# Patient Record
Sex: FEMALE | Race: WHITE | NOT HISPANIC OR LATINO
[De-identification: names, ages, dates, MRNs, and addresses within clinical notes are randomized per-mention and may not be internally consistent; named-entity substitution may affect disease eponyms.]

---

## 2018-03-20 PROBLEM — Z00.00 ENCOUNTER FOR PREVENTIVE HEALTH EXAMINATION: Status: ACTIVE | Noted: 2018-03-20

## 2018-03-28 ENCOUNTER — NON-APPOINTMENT (OUTPATIENT)
Age: 83
End: 2018-03-28

## 2018-03-28 ENCOUNTER — APPOINTMENT (OUTPATIENT)
Dept: INTERNAL MEDICINE | Facility: CLINIC | Age: 83
End: 2018-03-28
Payer: MEDICARE

## 2018-03-28 VITALS
HEIGHT: 62 IN | HEART RATE: 69 BPM | DIASTOLIC BLOOD PRESSURE: 80 MMHG | BODY MASS INDEX: 24.48 KG/M2 | OXYGEN SATURATION: 100 % | TEMPERATURE: 97.9 F | SYSTOLIC BLOOD PRESSURE: 220 MMHG | WEIGHT: 133 LBS

## 2018-03-28 VITALS — DIASTOLIC BLOOD PRESSURE: 60 MMHG | SYSTOLIC BLOOD PRESSURE: 188 MMHG

## 2018-03-28 DIAGNOSIS — Z13.89 ENCOUNTER FOR SCREENING FOR OTHER DISORDER: ICD-10-CM

## 2018-03-28 DIAGNOSIS — Z86.79 PERSONAL HISTORY OF OTHER DISEASES OF THE CIRCULATORY SYSTEM: ICD-10-CM

## 2018-03-28 DIAGNOSIS — Z78.9 OTHER SPECIFIED HEALTH STATUS: ICD-10-CM

## 2018-03-28 DIAGNOSIS — Z80.0 FAMILY HISTORY OF MALIGNANT NEOPLASM OF DIGESTIVE ORGANS: ICD-10-CM

## 2018-03-28 DIAGNOSIS — L57.0 ACTINIC KERATOSIS: ICD-10-CM

## 2018-03-28 PROCEDURE — 99204 OFFICE O/P NEW MOD 45 MIN: CPT | Mod: 25

## 2018-03-28 PROCEDURE — 36415 COLL VENOUS BLD VENIPUNCTURE: CPT

## 2018-03-28 PROCEDURE — 93000 ELECTROCARDIOGRAM COMPLETE: CPT

## 2018-03-28 PROCEDURE — G0444 DEPRESSION SCREEN ANNUAL: CPT | Mod: 59

## 2018-03-29 ENCOUNTER — APPOINTMENT (OUTPATIENT)
Dept: INTERNAL MEDICINE | Facility: CLINIC | Age: 83
End: 2018-03-29
Payer: MEDICARE

## 2018-03-29 VITALS — WEIGHT: 129 LBS | BODY MASS INDEX: 23.59 KG/M2

## 2018-03-29 VITALS — SYSTOLIC BLOOD PRESSURE: 186 MMHG | DIASTOLIC BLOOD PRESSURE: 68 MMHG

## 2018-03-29 VITALS — DIASTOLIC BLOOD PRESSURE: 60 MMHG | OXYGEN SATURATION: 98 % | HEART RATE: 72 BPM | SYSTOLIC BLOOD PRESSURE: 188 MMHG

## 2018-03-29 DIAGNOSIS — I27.20 PULMONARY HYPERTENSION, UNSPECIFIED: ICD-10-CM

## 2018-03-29 DIAGNOSIS — I16.0 HYPERTENSIVE URGENCY: ICD-10-CM

## 2018-03-29 PROCEDURE — 99215 OFFICE O/P EST HI 40 MIN: CPT

## 2018-04-05 LAB
ALDOSTERONE SERUM: 8 NG/DL
APTT BLD: 34.1 SEC
BASOPHILS # BLD AUTO: 0.01 K/UL
BASOPHILS NFR BLD AUTO: 0.1 %
EOSINOPHIL # BLD AUTO: 0.04 K/UL
EOSINOPHIL NFR BLD AUTO: 0.6 %
HCT VFR BLD CALC: 40.5 %
HGB BLD-MCNC: 13.1 G/DL
IMM GRANULOCYTES NFR BLD AUTO: 0.3 %
INR PPP: 0.96 RATIO
LYMPHOCYTES # BLD AUTO: 2 K/UL
LYMPHOCYTES NFR BLD AUTO: 28.5 %
MAN DIFF?: NORMAL
MCHC RBC-ENTMCNC: 32.3 GM/DL
MCHC RBC-ENTMCNC: 32.3 PG
MCV RBC AUTO: 100 FL
METANEPHRINE, PL: <10 PG/ML
MONOCYTES # BLD AUTO: 0.95 K/UL
MONOCYTES NFR BLD AUTO: 13.6 %
NEUTROPHILS # BLD AUTO: 3.99 K/UL
NEUTROPHILS NFR BLD AUTO: 56.9 %
NORMETANEPHRINE, PL: <10 PG/ML
PLATELET # BLD AUTO: 127 K/UL
PT BLD: 10.8 SEC
RBC # BLD: 4.05 M/UL
RBC # FLD: 13.6 %
TSH SERPL-ACNC: 1.66 UIU/ML
WBC # FLD AUTO: 7.01 K/UL

## 2018-04-06 LAB — RENIN ACTIVITY, PLASMA: 0.25 NG/ML/HR

## 2018-04-18 ENCOUNTER — APPOINTMENT (OUTPATIENT)
Dept: INTERNAL MEDICINE | Facility: CLINIC | Age: 83
End: 2018-04-18

## 2018-04-24 ENCOUNTER — APPOINTMENT (OUTPATIENT)
Dept: INTERNAL MEDICINE | Facility: CLINIC | Age: 83
End: 2018-04-24

## 2018-05-14 ENCOUNTER — APPOINTMENT (OUTPATIENT)
Dept: INTERNAL MEDICINE | Facility: CLINIC | Age: 83
End: 2018-05-14

## 2018-05-23 ENCOUNTER — RX RENEWAL (OUTPATIENT)
Age: 83
End: 2018-05-23

## 2018-06-12 ENCOUNTER — APPOINTMENT (OUTPATIENT)
Dept: INTERNAL MEDICINE | Facility: CLINIC | Age: 83
End: 2018-06-12
Payer: MEDICARE

## 2018-06-12 VITALS
HEART RATE: 69 BPM | OXYGEN SATURATION: 99 % | BODY MASS INDEX: 23.92 KG/M2 | SYSTOLIC BLOOD PRESSURE: 156 MMHG | DIASTOLIC BLOOD PRESSURE: 65 MMHG | TEMPERATURE: 98 F | HEIGHT: 62 IN | WEIGHT: 130 LBS

## 2018-06-12 VITALS — SYSTOLIC BLOOD PRESSURE: 172 MMHG | DIASTOLIC BLOOD PRESSURE: 60 MMHG

## 2018-06-12 PROCEDURE — 99214 OFFICE O/P EST MOD 30 MIN: CPT | Mod: 25

## 2018-06-12 PROCEDURE — 36415 COLL VENOUS BLD VENIPUNCTURE: CPT

## 2018-06-14 LAB
ALBUMIN SERPL ELPH-MCNC: 4.3 G/DL
ALP BLD-CCNC: 108 U/L
ALT SERPL-CCNC: 25 U/L
ANION GAP SERPL CALC-SCNC: 17 MMOL/L
AST SERPL-CCNC: 23 U/L
BILIRUB SERPL-MCNC: 0.4 MG/DL
BUN SERPL-MCNC: 16 MG/DL
CALCIUM SERPL-MCNC: 10 MG/DL
CHLORIDE SERPL-SCNC: 102 MMOL/L
CO2 SERPL-SCNC: 25 MMOL/L
CREAT SERPL-MCNC: 1 MG/DL
GLUCOSE SERPL-MCNC: 83 MG/DL
POTASSIUM SERPL-SCNC: 4.4 MMOL/L
PROT SERPL-MCNC: 7.4 G/DL
SODIUM SERPL-SCNC: 144 MMOL/L

## 2018-07-02 ENCOUNTER — APPOINTMENT (OUTPATIENT)
Dept: INTERNAL MEDICINE | Facility: CLINIC | Age: 83
End: 2018-07-02
Payer: MEDICARE

## 2018-07-02 VITALS
DIASTOLIC BLOOD PRESSURE: 62 MMHG | BODY MASS INDEX: 23.92 KG/M2 | SYSTOLIC BLOOD PRESSURE: 155 MMHG | RESPIRATION RATE: 16 BRPM | HEIGHT: 62 IN | WEIGHT: 130 LBS | HEART RATE: 83 BPM | OXYGEN SATURATION: 97 % | TEMPERATURE: 98.4 F

## 2018-07-02 DIAGNOSIS — Z01.818 ENCOUNTER FOR OTHER PREPROCEDURAL EXAMINATION: ICD-10-CM

## 2018-07-02 PROCEDURE — 99214 OFFICE O/P EST MOD 30 MIN: CPT

## 2018-07-18 ENCOUNTER — APPOINTMENT (OUTPATIENT)
Dept: INTERNAL MEDICINE | Facility: CLINIC | Age: 83
End: 2018-07-18

## 2018-07-25 ENCOUNTER — RX RENEWAL (OUTPATIENT)
Age: 83
End: 2018-07-25

## 2018-08-18 ENCOUNTER — RX RENEWAL (OUTPATIENT)
Age: 83
End: 2018-08-18

## 2018-08-21 ENCOUNTER — RX RENEWAL (OUTPATIENT)
Age: 83
End: 2018-08-21

## 2018-11-30 ENCOUNTER — APPOINTMENT (OUTPATIENT)
Dept: INTERNAL MEDICINE | Facility: CLINIC | Age: 83
End: 2018-11-30
Payer: MEDICARE

## 2018-11-30 VITALS
BODY MASS INDEX: 25.91 KG/M2 | OXYGEN SATURATION: 98 % | RESPIRATION RATE: 16 BRPM | WEIGHT: 132 LBS | DIASTOLIC BLOOD PRESSURE: 65 MMHG | TEMPERATURE: 98.2 F | HEART RATE: 65 BPM | SYSTOLIC BLOOD PRESSURE: 159 MMHG | HEIGHT: 60 IN

## 2018-11-30 DIAGNOSIS — I35.0 NONRHEUMATIC AORTIC (VALVE) STENOSIS: ICD-10-CM

## 2018-11-30 DIAGNOSIS — H26.9 UNSPECIFIED CATARACT: ICD-10-CM

## 2018-11-30 PROCEDURE — 36415 COLL VENOUS BLD VENIPUNCTURE: CPT

## 2018-11-30 PROCEDURE — 99214 OFFICE O/P EST MOD 30 MIN: CPT | Mod: 25

## 2018-11-30 PROCEDURE — 93000 ELECTROCARDIOGRAM COMPLETE: CPT

## 2018-12-02 VITALS — DIASTOLIC BLOOD PRESSURE: 58 MMHG | SYSTOLIC BLOOD PRESSURE: 164 MMHG

## 2018-12-02 PROBLEM — H26.9 CATARACT: Status: ACTIVE | Noted: 2018-03-29

## 2018-12-02 PROBLEM — I35.0 AORTIC STENOSIS: Status: ACTIVE | Noted: 2018-06-12

## 2019-03-16 ENCOUNTER — RX RENEWAL (OUTPATIENT)
Age: 84
End: 2019-03-16

## 2022-03-04 ENCOUNTER — APPOINTMENT (OUTPATIENT)
Dept: INTERNAL MEDICINE | Facility: CLINIC | Age: 87
End: 2022-03-04

## 2023-06-05 ENCOUNTER — INPATIENT (INPATIENT)
Facility: HOSPITAL | Age: 88
LOS: 5 days | Discharge: HOME HEALTH | DRG: 149 | End: 2023-06-11
Attending: PSYCHIATRY & NEUROLOGY | Admitting: PSYCHIATRY & NEUROLOGY
Payer: MEDICARE

## 2023-06-05 VITALS
RESPIRATION RATE: 18 BRPM | WEIGHT: 132.06 LBS | OXYGEN SATURATION: 95 % | SYSTOLIC BLOOD PRESSURE: 207 MMHG | HEART RATE: 125 BPM | TEMPERATURE: 98 F | HEIGHT: 62 IN | DIASTOLIC BLOOD PRESSURE: 86 MMHG

## 2023-06-05 LAB
ALBUMIN SERPL ELPH-MCNC: 4.7 G/DL — SIGNIFICANT CHANGE UP (ref 3.3–5)
ALP SERPL-CCNC: 178 U/L — HIGH (ref 40–120)
ALT FLD-CCNC: 17 U/L — SIGNIFICANT CHANGE UP (ref 10–45)
ANION GAP SERPL CALC-SCNC: 15 MMOL/L — SIGNIFICANT CHANGE UP (ref 5–17)
APPEARANCE UR: CLEAR — SIGNIFICANT CHANGE UP
APTT BLD: SIGNIFICANT CHANGE UP SEC (ref 27.5–35.5)
AST SERPL-CCNC: 28 U/L — SIGNIFICANT CHANGE UP (ref 10–40)
BACTERIA # UR AUTO: SIGNIFICANT CHANGE UP /HPF
BASOPHILS # BLD AUTO: 0.02 K/UL — SIGNIFICANT CHANGE UP (ref 0–0.2)
BASOPHILS NFR BLD AUTO: 0.2 % — SIGNIFICANT CHANGE UP (ref 0–2)
BILIRUB SERPL-MCNC: 0.9 MG/DL — SIGNIFICANT CHANGE UP (ref 0.2–1.2)
BILIRUB UR-MCNC: NEGATIVE — SIGNIFICANT CHANGE UP
BUN SERPL-MCNC: 8 MG/DL — SIGNIFICANT CHANGE UP (ref 7–23)
CALCIUM SERPL-MCNC: 9.5 MG/DL — SIGNIFICANT CHANGE UP (ref 8.4–10.5)
CHLORIDE SERPL-SCNC: 100 MMOL/L — SIGNIFICANT CHANGE UP (ref 96–108)
CO2 SERPL-SCNC: 23 MMOL/L — SIGNIFICANT CHANGE UP (ref 22–31)
COLOR SPEC: YELLOW — SIGNIFICANT CHANGE UP
CREAT SERPL-MCNC: 0.65 MG/DL — SIGNIFICANT CHANGE UP (ref 0.5–1.3)
DIFF PNL FLD: ABNORMAL
EGFR: 86 ML/MIN/1.73M2 — SIGNIFICANT CHANGE UP
EOSINOPHIL # BLD AUTO: 0.01 K/UL — SIGNIFICANT CHANGE UP (ref 0–0.5)
EOSINOPHIL NFR BLD AUTO: 0.1 % — SIGNIFICANT CHANGE UP (ref 0–6)
EPI CELLS # UR: SIGNIFICANT CHANGE UP /HPF (ref 0–5)
GLUCOSE SERPL-MCNC: 158 MG/DL — HIGH (ref 70–99)
GLUCOSE UR QL: 100
HCT VFR BLD CALC: 52.9 % — HIGH (ref 34.5–45)
HGB BLD-MCNC: 17.6 G/DL — HIGH (ref 11.5–15.5)
IMM GRANULOCYTES NFR BLD AUTO: 0.5 % — SIGNIFICANT CHANGE UP (ref 0–0.9)
INR BLD: 1.07 — SIGNIFICANT CHANGE UP (ref 0.88–1.16)
KETONES UR-MCNC: 15 MG/DL
LEUKOCYTE ESTERASE UR-ACNC: NEGATIVE — SIGNIFICANT CHANGE UP
LYMPHOCYTES # BLD AUTO: 0.63 K/UL — LOW (ref 1–3.3)
LYMPHOCYTES # BLD AUTO: 7.4 % — LOW (ref 13–44)
MCHC RBC-ENTMCNC: 31.1 PG — SIGNIFICANT CHANGE UP (ref 27–34)
MCHC RBC-ENTMCNC: 33.3 GM/DL — SIGNIFICANT CHANGE UP (ref 32–36)
MCV RBC AUTO: 93.5 FL — SIGNIFICANT CHANGE UP (ref 80–100)
MONOCYTES # BLD AUTO: 0.52 K/UL — SIGNIFICANT CHANGE UP (ref 0–0.9)
MONOCYTES NFR BLD AUTO: 6.1 % — SIGNIFICANT CHANGE UP (ref 2–14)
NEUTROPHILS # BLD AUTO: 7.33 K/UL — SIGNIFICANT CHANGE UP (ref 1.8–7.4)
NEUTROPHILS NFR BLD AUTO: 85.7 % — HIGH (ref 43–77)
NITRITE UR-MCNC: NEGATIVE — SIGNIFICANT CHANGE UP
NRBC # BLD: 0 /100 WBCS — SIGNIFICANT CHANGE UP (ref 0–0)
PH UR: 7.5 — SIGNIFICANT CHANGE UP (ref 5–8)
PLATELET # BLD AUTO: 75 K/UL — LOW (ref 150–400)
POTASSIUM SERPL-MCNC: 4.1 MMOL/L — SIGNIFICANT CHANGE UP (ref 3.5–5.3)
POTASSIUM SERPL-SCNC: 4.1 MMOL/L — SIGNIFICANT CHANGE UP (ref 3.5–5.3)
PROT SERPL-MCNC: 8 G/DL — SIGNIFICANT CHANGE UP (ref 6–8.3)
PROT UR-MCNC: >=300 MG/DL
PROTHROM AB SERPL-ACNC: 12.7 SEC — SIGNIFICANT CHANGE UP (ref 10.5–13.4)
RBC # BLD: 5.66 M/UL — HIGH (ref 3.8–5.2)
RBC # FLD: 13.1 % — SIGNIFICANT CHANGE UP (ref 10.3–14.5)
RBC CASTS # UR COMP ASSIST: ABNORMAL /HPF
SODIUM SERPL-SCNC: 138 MMOL/L — SIGNIFICANT CHANGE UP (ref 135–145)
SP GR SPEC: 1.02 — SIGNIFICANT CHANGE UP (ref 1–1.03)
UROBILINOGEN FLD QL: 0.2 E.U./DL — SIGNIFICANT CHANGE UP
WBC # BLD: 8.55 K/UL — SIGNIFICANT CHANGE UP (ref 3.8–10.5)
WBC # FLD AUTO: 8.55 K/UL — SIGNIFICANT CHANGE UP (ref 3.8–10.5)
WBC UR QL: < 5 /HPF — SIGNIFICANT CHANGE UP

## 2023-06-05 PROCEDURE — 70498 CT ANGIOGRAPHY NECK: CPT | Mod: 26,MC

## 2023-06-05 PROCEDURE — 71045 X-RAY EXAM CHEST 1 VIEW: CPT | Mod: 26

## 2023-06-05 PROCEDURE — 99291 CRITICAL CARE FIRST HOUR: CPT

## 2023-06-05 PROCEDURE — 70496 CT ANGIOGRAPHY HEAD: CPT | Mod: 26,MC

## 2023-06-05 PROCEDURE — 0042T: CPT

## 2023-06-05 RX ORDER — ONDANSETRON 8 MG/1
4 TABLET, FILM COATED ORAL EVERY 6 HOURS
Refills: 0 | Status: DISCONTINUED | OUTPATIENT
Start: 2023-06-05 | End: 2023-06-08

## 2023-06-05 RX ORDER — APIXABAN 2.5 MG/1
2.5 TABLET, FILM COATED ORAL EVERY 12 HOURS
Refills: 0 | Status: DISCONTINUED | OUTPATIENT
Start: 2023-06-05 | End: 2023-06-11

## 2023-06-05 RX ORDER — SODIUM CHLORIDE 9 MG/ML
1000 INJECTION INTRAMUSCULAR; INTRAVENOUS; SUBCUTANEOUS
Refills: 0 | Status: DISCONTINUED | OUTPATIENT
Start: 2023-06-05 | End: 2023-06-06

## 2023-06-05 RX ORDER — LABETALOL HCL 100 MG
5 TABLET ORAL ONCE
Refills: 0 | Status: COMPLETED | OUTPATIENT
Start: 2023-06-05 | End: 2023-06-05

## 2023-06-05 RX ORDER — ATORVASTATIN CALCIUM 80 MG/1
80 TABLET, FILM COATED ORAL AT BEDTIME
Refills: 0 | Status: DISCONTINUED | OUTPATIENT
Start: 2023-06-05 | End: 2023-06-07

## 2023-06-05 RX ORDER — MECLIZINE HCL 12.5 MG
25 TABLET ORAL ONCE
Refills: 0 | Status: COMPLETED | OUTPATIENT
Start: 2023-06-05 | End: 2023-06-05

## 2023-06-05 RX ORDER — MECLIZINE HCL 12.5 MG
12.5 TABLET ORAL EVERY 8 HOURS
Refills: 0 | Status: DISCONTINUED | OUTPATIENT
Start: 2023-06-05 | End: 2023-06-06

## 2023-06-05 RX ORDER — SODIUM CHLORIDE 9 MG/ML
1000 INJECTION INTRAMUSCULAR; INTRAVENOUS; SUBCUTANEOUS ONCE
Refills: 0 | Status: COMPLETED | OUTPATIENT
Start: 2023-06-05 | End: 2023-06-05

## 2023-06-05 RX ORDER — METOPROLOL TARTRATE 50 MG
5 TABLET ORAL ONCE
Refills: 0 | Status: COMPLETED | OUTPATIENT
Start: 2023-06-05 | End: 2023-06-05

## 2023-06-05 RX ORDER — ONDANSETRON 8 MG/1
4 TABLET, FILM COATED ORAL ONCE
Refills: 0 | Status: COMPLETED | OUTPATIENT
Start: 2023-06-05 | End: 2023-06-05

## 2023-06-05 RX ADMIN — ATORVASTATIN CALCIUM 80 MILLIGRAM(S): 80 TABLET, FILM COATED ORAL at 22:22

## 2023-06-05 RX ADMIN — SODIUM CHLORIDE 75 MILLILITER(S): 9 INJECTION INTRAMUSCULAR; INTRAVENOUS; SUBCUTANEOUS at 18:27

## 2023-06-05 RX ADMIN — Medication 5 MILLIGRAM(S): at 23:34

## 2023-06-05 RX ADMIN — SODIUM CHLORIDE 1000 MILLILITER(S): 9 INJECTION INTRAMUSCULAR; INTRAVENOUS; SUBCUTANEOUS at 13:46

## 2023-06-05 RX ADMIN — Medication 5 MILLIGRAM(S): at 14:06

## 2023-06-05 RX ADMIN — ONDANSETRON 4 MILLIGRAM(S): 8 TABLET, FILM COATED ORAL at 13:45

## 2023-06-05 RX ADMIN — APIXABAN 2.5 MILLIGRAM(S): 2.5 TABLET, FILM COATED ORAL at 18:28

## 2023-06-05 RX ADMIN — Medication 25 MILLIGRAM(S): at 13:45

## 2023-06-05 NOTE — ED ADULT TRIAGE NOTE - BMI (KG/M2)
Patient calling to schedule COLPO and EMB. MD next available at both locations is 12/31. Patient is currently scheduled for 1/4. Patient would like to know if there is a slot that she can be see sooner. Please advise.    24.2

## 2023-06-05 NOTE — ED PROVIDER NOTE - IV ALTEPLASE DOOR HIDDEN
show Ear Star Wedge Flap Text: The defect edges were debeveled with a #15 blade scalpel.  Given the location of the defect and the proximity to free margins (helical rim) an ear star wedge flap was deemed most appropriate.  Using a sterile surgical marker, the appropriate flap was drawn incorporating the defect and placing the expected incisions between the helical rim and antihelix where possible.  The area thus outlined was incised through and through with a #15 scalpel blade.

## 2023-06-05 NOTE — H&P ADULT - HISTORY OF PRESENT ILLNESS
**STROKE HPI***    HPI: 88y Female with PMHx of HTN (patient report this to be intermittent, not on any medications), maintains a vegan diet presents to the ED with vertigo with room spinning since  0000. The patient awoke at midnight last night and began to have room spinning and severe dizziness with nausea with any movement. She was afraid to move but called out to her  who couldn't hear her as he was sleeping in another room. She got little sleep but awoke this morning with persistent dizziness with nausea and room spinning with associated nausea and vomiting with any movement or changes in position but was asymptomatic at rest. Stroke code called on arrival to the ED. NIHSS 0. Exam significant for right beating nystagmus. Found to be in A fib on EKG.     PAST MEDICAL & SURGICAL HISTORY:      FAMILY HISTORY:      SOCIAL HISTORY:   Patient lives with *** at ***.   Smoking status:  Drinking:  Drug Use:     ROS: ***  Constitutional: No fever, weight loss or fatigue  Eyes: No eye pain, visual disturbances, or discharge  ENMT:  No difficulty hearing, tinnitus, vertigo; No sinus or throat pain  Neck: No pain or stiffness  Respiratory: No cough, wheezing, chills or hemoptysis  Cardiovascular: No chest pain, palpitations, shortness of breath, dizziness or leg swelling  Gastrointestinal: No abdominal pain. No nausea, vomiting or hematemesis; No diarrhea or constipation. Nohematochezia.  Genitourinary: No dysuria, frequency, hematuria or incontinence  Neurological: As per HPI  Skin: No itching, burning, rashes or lesions   Endocrine: No heat or cold intolerance; No hair loss  Musculoskeletal: No joint pain or swelling; No muscle, back or extremity pain  Psychiatric: No depression, anxiety, mood swings or difficulty sleeping  Heme/Lymph: No easy bruising or bleeding gums    T(C): 36.4 (23 @ 13:01), Max: 36.4 (23 @ 13:01)  HR: 75 (23 @ 15:06) (75 - 125)  BP: 171/77 (23 @ 15:06) (171/77 - 213/100)  RR: 18 (23 @ 15:06) (18 - 18)  SpO2: 97% (06-05-23 @ 15:06) (94% - 97%)    MEDICATION RECONCILIATION   MEDICATIONS  (STANDING):  apixaban 2.5 milliGRAM(s) Oral every 12 hours  atorvastatin 80 milliGRAM(s) Oral at bedtime  sodium chloride 0.9%. 1000 milliLiter(s) (75 mL/Hr) IV Continuous <Continuous>    MEDICATIONS  (PRN):  meclizine 12.5 milliGRAM(s) Oral every 8 hours PRN Dizziness  ondansetron Injectable 4 milliGRAM(s) IV Push every 6 hours PRN Nausea and/or Vomiting    Allergies    penicillin (Anaphylaxis)    Intolerances      Vital Signs Last 24 Hrs  T(C): 36.4 (2023 13:01), Max: 36.4 (2023 13:01)  T(F): 97.5 (2023 13:01), Max: 97.5 (2023 13:01)  HR: 75 (2023 15:06) (75 - 125)  BP: 171/77 (2023 15:06) (171/77 - 213/100)  BP(mean): --  RR: 18 (2023 15:06) (18 - 18)  SpO2: 97% (2023 15:06) (94% - 97%)    Parameters below as of 2023 15:06  Patient On (Oxygen Delivery Method): room air        Physical exam:  General: No acute distress, awake and alert  Cardiovascular: Regular rate and rhythm, no murmurs, rubs, or gallops. No bruits  Pulmonary: Anterior breath sounds clear bilaterally, no crackles or wheezing. No use of accessory muscles  GI: Abdomen soft, non-tender, non-distended    Neurologic:  -Mental status: Awake, alert, oriented to person, place, and time. Speech is fluent with intact naming, repetition, and comprehension, no dysarthria. Recent and remote memory intact. Follows commands. Attention/concentration intact. Fund of knowledge appropriate.  -Cranial nerves:   II: Visual fields are full to confrontation.  III, IV, VI: Extraocular movements are intact without nystagmus. Pupils equally round and reactive to light  V:  Facial sensation V1-V3 equal and intact   VII: Face is symmetric with normal eye closure and smile  VIII: Hearing is bilaterally intact to finger rub  IX, X: Uvula is midline and soft palate rises symmetrically  XI: Head turning and shoulder shrug are intact.  XII: Tongue protrudes midline  Motor: Normal bulk and tone. No pronator drift. Strength bilateral upper extremity 5/5, bilateral lower extremities 5/5.  Rapid alternating movements intact and symmetric  Sensation: Intact to light touch bilaterally. No neglect or extinction on double simultaneous testing.  Coordination: No dysmetria on finger-to-nose and heel-to-shin bilaterally  Reflexes: Downgoing toes bilaterally   Gait: Narrow gait and steady    NIHSS: **** ASPECT Score: *** ICH Score: *** (GCS)    Fingerstick Blood Glucose: CAPILLARY BLOOD GLUCOSE      POCT Blood Glucose.: 140 mg/dL (2023 12:54)    LABS:                        17.6   8.55  )-----------( 75       ( 2023 13:08 )             52.9     -    138  |  100  |  8   ----------------------------<  158<H>  4.1   |  23  |  0.65    Ca    9.5      2023 13:08    TPro  8.0  /  Alb  4.7  /  TBili  0.9  /  DBili  x   /  AST  28  /  ALT  17  /  AlkPhos  178<H>  06-05    PT/INR - ( 2023 13:08 )   PT: see note sec;   INR: see note         PTT - ( 2023 13:08 )  PTT:see note sec      Urinalysis Basic - ( 2023 16:28 )    Color: Yellow / Appearance: Clear / S.025 / pH: x  Gluc: x / Ketone: 15 mg/dL  / Bili: Negative / Urobili: 0.2 E.U./dL   Blood: x / Protein: >=300 mg/dL / Nitrite: NEGATIVE   Leuk Esterase: NEGATIVE / RBC: 5-10 /HPF / WBC < 5 /HPF   Sq Epi: x / Non Sq Epi: x / Bacteria: None /HPF        RADIOLOGY & ADDITIONAL STUDIES:    HCT:     CTA:    -----------------------------------------------------------------------------------------    ASSESSMENT/PLAN:    88y Female w/ PMH ***. HCT showed ***. CTA showed ***. Given *** tPA was ***. Patient was admitted to **** for further workup    **STROKE HPI***    HPI: 88y Female with PMHx of HTN (patient report this to be intermittent, not on any medications), maintains a vegan diet presents to the ED with vertigo with room spinning since 6/5 0000. The patient awoke at midnight last night and began to have room spinning and severe dizziness with nausea with any movement. She was afraid to move but called out to her  who couldn't hear her as he was sleeping in another room. She got little sleep but awoke this morning with persistent dizziness with nausea and room spinning with associated nausea and vomiting with any movement or changes in position but was asymptomatic at rest. Stroke code called on arrival to the ED. NIHSS 0. Exam significant for right beating nystagmus. Found to be in A fib on EKG, hypertensive with systolic BP in the 200's. Given 5mg IV lopressor with good effect. CTH negative for any acute intracranial pathology. CTA negative for any large vessel occlusion or high grade stenosis. CTP negative for perfusion deficit. Patient out of the window for TNK, not a candidate for mechanical thrombectomy. Of note, patient has not seen a primary care doctor since prior to the pandemic but has no know atrial fibrillation or cardiac issues.     PAST MEDICAL & SURGICAL HISTORY:      FAMILY HISTORY:      SOCIAL HISTORY:         T(C): 36.4 (06-05-23 @ 13:01), Max: 36.4 (06-05-23 @ 13:01)  HR: 75 (06-05-23 @ 15:06) (75 - 125)  BP: 171/77 (06-05-23 @ 15:06) (171/77 - 213/100)  RR: 18 (06-05-23 @ 15:06) (18 - 18)  SpO2: 97% (06-05-23 @ 15:06) (94% - 97%)    MEDICATION RECONCILIATION   MEDICATIONS  (STANDING):  apixaban 2.5 milliGRAM(s) Oral every 12 hours  atorvastatin 80 milliGRAM(s) Oral at bedtime  sodium chloride 0.9%. 1000 milliLiter(s) (75 mL/Hr) IV Continuous <Continuous>    MEDICATIONS  (PRN):  meclizine 12.5 milliGRAM(s) Oral every 8 hours PRN Dizziness  ondansetron Injectable 4 milliGRAM(s) IV Push every 6 hours PRN Nausea and/or Vomiting    Allergies    penicillin (Anaphylaxis)    Intolerances      Vital Signs Last 24 Hrs  T(C): 36.4 (05 Jun 2023 13:01), Max: 36.4 (05 Jun 2023 13:01)  T(F): 97.5 (05 Jun 2023 13:01), Max: 97.5 (05 Jun 2023 13:01)  HR: 75 (05 Jun 2023 15:06) (75 - 125)  BP: 171/77 (05 Jun 2023 15:06) (171/77 - 213/100)  BP(mean): --  RR: 18 (05 Jun 2023 15:06) (18 - 18)  SpO2: 97% (05 Jun 2023 15:06) (94% - 97%)    Parameters below as of 05 Jun 2023 15:06  Patient On (Oxygen Delivery Method): room air

## 2023-06-05 NOTE — H&P ADULT - ASSESSMENT
88y Female with PMHx of HTN (patient report this to be intermittent, not on any medications), maintains a vegan diet presents to the ED with vertigo with room spinning since last night with exacerbation of symptoms with any movement and relief of symptoms with rest. Stroke code called on arrival to the ED. NIHSS 0. Exam significant for right beating nystagmus. Found to be in A fib on EKG, hypertensive with systolic BP in the 200's. Given 5mg IV lopressor with good effect. CTH negative for any acute intracranial pathology. CTA negative for any large vessel occlusion or high grade stenosis. CTP negative for perfusion deficit. Patient out of the window for TNK, not a candidate for mechanical thrombectomy. Although suspicion for stroke is low given relief of symptoms at rest, given new onset Afib patient will be admitted to stroke service for further workup.     Neuro  #CVA workup  - continue Eliquis 2.5mg BID  - continue atorvastatin 80mg daily  - q4hr stroke neuro checks and vitals  - obtain MRI Brain without contrast  - Stroke Code HCT Results: negative  - Stroke Code CTA Results: negative  - Stroke education    #Vertigo  - Meclizine 12.5mg q8hr PRN  - Zofran 4mg q6hr PRN  - NS @ 75cc/hr    Cards  #HTN  - permissive hypertension, Goal -180  - patient not on any hypertensive meds at home  - obtain TTE   - Stroke Code EKG Results: Atrial fibrillation w/ RVR         #HLD  - high dose statin as above in CVA  - LDL results: pending    Pulm  - call provider if SPO2 < 94%    GI  #Nutrition/Fluids/Electrolytes   - replete K<4 and Mg <2  - Diet: Vegan  - IVF: NS @ 75cc/hr    Renal  -     Infectious Disease  - Stroke Code CXR results:     Endocrine  #DM  - A1C results:   - ISS    - TSH results:    DVT Prophylaxis  - lovenox sq for DVT prophylaxis   - SCDs for DVT prophylaxis       IDR Goals: Goals reviewed at interdisciplinary rounds with case management, social work, physical therapy, occupational therapy, and speech language pathology.   Please see specific therapy  notes for in depth goals.  Dispo: **********(Acute rehab - can tolerate 3 hours of therapy)     Discussed daily hospital plans and goals with patient and family at bedside. (Called and updated family.)    Discussed with Neurology Attending 88y Female with PMHx of HTN (patient report this to be intermittent, not on any medications), maintains a vegan diet presents to the ED with vertigo with room spinning since last night with exacerbation of symptoms with any movement and relief of symptoms with rest. Stroke code called on arrival to the ED. NIHSS 0. Exam significant for right beating nystagmus. Found to be in A fib on EKG, hypertensive with systolic BP in the 200's. Given 5mg IV lopressor with good effect. CTH negative for any acute intracranial pathology. CTA negative for any large vessel occlusion or high grade stenosis. CTP negative for perfusion deficit. Patient out of the window for TNK, not a candidate for mechanical thrombectomy. Although suspicion for stroke is low given relief of symptoms at rest, given new onset Afib patient will be admitted to stroke service for further workup.     Neuro  #CVA workup  - continue Eliquis 2.5mg BID  - continue atorvastatin 80mg daily  - q4hr stroke neuro checks and vitals  - obtain MRI Brain without contrast  - Stroke Code HCT Results: negative  - Stroke Code CTA Results: negative  - Stroke education    #Vertigo  - Meclizine 12.5mg q8hr PRN  - Zofran 4mg q6hr PRN  - NS @ 75cc/hr    Cards  #HTN  - permissive hypertension, Goal -180  - patient not on any hypertensive meds at home  - obtain TTE   - Stroke Code EKG Results: Atrial fibrillation w/ RVR     #New onset Afib  - Received one dose of IV lopressor 5mg in ED for RVR  - Start Eliquis 2.5mg BID  - Consider cardiology consult if patient goes into RVR      #HLD  - high dose statin as above in CVA  - LDL results: pending    Pulm  - call provider if SPO2 < 94%    GI  #Nutrition/Fluids/Electrolytes   - replete K<4 and Mg <2  - Diet: Vegan  - IVF: NS @ 75cc/hr    Renal  - Trend BMP    Infectious Disease  - Stroke Code CXR results: No evidence of acute cardiopulmonary disease    Endocrine  - A1C results: pending    - TSH results: pending    DVT Prophylaxis  - c/w Eliquis   - SCDs for DVT prophylaxis       IDR Goals: Goals reviewed at interdisciplinary rounds with case management, social work, physical therapy, occupational therapy, and speech language pathology.   Please see specific therapy  notes for in depth goals.  Dispo: Pending PT/OT evals     Discussed daily hospital plans and goals with patient and family at bedside.     Discussed with Neurology Attending Dr. Katerin Monet

## 2023-06-05 NOTE — ED ADULT NURSE NOTE - NS ED NURSE REPORT GIVEN TO FT
Jenn Jenn/ report given by [place] MELGAR to [place] MELGAR Jenn MELGAR Jenn MELGAR/ report given to Ritesh

## 2023-06-05 NOTE — H&P ADULT - NSHPPHYSICALEXAM_GEN_ALL_CORE
Physical exam:  General: No acute distress, awake and alert  Cardiovascular: Regular rate and rhythm, no murmurs, rubs, or gallops. No bruits  Pulmonary: Anterior breath sounds clear bilaterally, no crackles or wheezing. No use of accessory muscles  GI: Abdomen soft, non-tender, non-distended    Neurologic:  -Mental status: Awake, alert, oriented to person, place, and time. Speech is fluent with intact naming, repetition, and comprehension, no dysarthria. Recent and remote memory intact. Follows commands. Attention/concentration intact. Fund of knowledge appropriate.  -Cranial nerves:   II: Visual fields are full to confrontation.  III, IV, VI: Extraocular movements are intact persistent right beating nystagmus noted. Pupils equally round and reactive to light  V:  Facial sensation V1-V3 equal and intact   VII: Face is symmetric with normal smile  Motor: Normal bulk and tone. No pronator drift. Strength bilateral upper extremity 5/5, bilateral lower extremities 5/5.  Rapid alternating movements intact and symmetric  Sensation: Intact to light touch bilaterally. No neglect or extinction on double simultaneous testing.  Coordination: No dysmetria on finger-to-nose  bilaterally  Gait: Deferred, patient w/ severe room spinning upon sitting up from CT table    NIHSS:0

## 2023-06-05 NOTE — ED PROVIDER NOTE - CONSTITUTIONAL, MLM
normal... Eyes closed, mild distress 2/2 dizziness. Awake, alert, oriented to person, place, time/situation.

## 2023-06-05 NOTE — ED PROVIDER NOTE - NEUROLOGICAL, MLM
Alert & Oriented x 3. CN II-XII intact. No facial droop. Clear speech. WILLIAM w/ 5/5 strength x 4 ext. Normal sensation. No pronator drift. No dysdidokinesia nor dysmetria. Normal heel-to-shin.

## 2023-06-05 NOTE — H&P ADULT - NSHPLABSRESULTS_GEN_ALL_CORE
Fingerstick Blood Glucose: CAPILLARY BLOOD GLUCOSE      POCT Blood Glucose.: 140 mg/dL (2023 12:54)    LABS:                        17.6   8.55  )-----------( 75       ( 2023 13:08 )             52.9         138  |  100  |  8   ----------------------------<  158<H>  4.1   |  23  |  0.65    Ca    9.5      2023 13:08    TPro  8.0  /  Alb  4.7  /  TBili  0.9  /  DBili  x   /  AST  28  /  ALT  17  /  AlkPhos  178<H>  06-    PT/INR - ( 2023 13:08 )   PT: see note sec;   INR: see note         PTT - ( 2023 13:08 )  PTT:see note sec      Urinalysis Basic - ( 2023 16:28 )    Color: Yellow / Appearance: Clear / S.025 / pH: x  Gluc: x / Ketone: 15 mg/dL  / Bili: Negative / Urobili: 0.2 E.U./dL   Blood: x / Protein: >=300 mg/dL / Nitrite: NEGATIVE   Leuk Esterase: NEGATIVE / RBC: 5-10 /HPF / WBC < 5 /HPF   Sq Epi: x / Non Sq Epi: x / Bacteria: None /HPF        RADIOLOGY & ADDITIONAL STUDIES:     CT Brain Stroke Protocol (23 @ 13:13) >    IMPRESSION: No intracranial hemorrhage or acute transcortical infarct.    CT Angio Neck w/ IV Cont (23 @ 13:27) >    IMPRESSION: Normal CTA of the neck.    CT Angio Head w/ IV Cont (23 @ 13:27) >    IMPRESSION: No large vessel occlusion.    CT Perfusion w/ Maps w/ IV Cont (23 @ 13:28) >    IMPRESSION: Normal CT perfusion study.

## 2023-06-05 NOTE — ED ADULT NURSE NOTE - NSFALLUNIVINTERV_ED_ALL_ED
Bed/Stretcher in lowest position, wheels locked, appropriate side rails in place/Call bell, personal items and telephone in reach/Instruct patient to call for assistance before getting out of bed/chair/stretcher/Non-slip footwear applied when patient is off stretcher/[place] to call system/Physically safe environment - no spills, clutter or unnecessary equipment/Purposeful proactive rounding/Room/bathroom lighting operational, light cord in reach

## 2023-06-05 NOTE — ED ADULT NURSE NOTE - OBJECTIVE STATEMENT
pt presents to ER c/o dizziness, like the room is spinning, since 12AM this morning. pt states the dizziness only occurs when she is attempting to sit up or move around. pt otherwise neurologically intact. no unifocal deficits.

## 2023-06-05 NOTE — ED PROVIDER NOTE - CLINICAL SUMMARY MEDICAL DECISION MAKING FREE TEXT BOX
87 y/o F presents to ED with new onset vertigo associated with high BP in 200s and new onset Afib at 117 concerning for stroke. Code stroke called on arrival. Imaging does not show acute stroke. Pt given meclizine and zofran to treat vertigo. Also given lopressor 5mg IV for HR with improvement to 80s-90s. Dispo as per stroke. 87 y/o F presents to ED with new onset vertigo associated with high BP in 200s and new onset Afib at 117 concerning for stroke. Code stroke called on arrival. Imaging does not show acute stroke. Pt given meclizine and zofran to treat vertigo. Also given lopressor 5mg IV for HR with improvement to 80s-90s. Dispo as per stroke.    Stroke to admit for new onset vertigo in setting of new onset a fib.  Pt stable at time of admission with HR controlled with lopressor.  Pt admitted to stroke tele.

## 2023-06-05 NOTE — ED PROVIDER NOTE - OBJECTIVE STATEMENT
87 y/o F with PMHx HTN (noncompliant with medications, hasn't taken meds in years) presents to ED c/o dizziness. Pt states she was trying to fall asleep around 12am last night but had onset of room spinning sensation and inability to walk with associated nausea and dry heaving. She had the same symptoms at 8am which prompted ED visit. Denies headache, vision changes, extremity weakness/numbness, or h/o similar symptoms in the past. Pt is not on ac. Code stroke called on ED arrival. Pt also noted to have very high BP in the 200s.

## 2023-06-05 NOTE — ED ADULT TRIAGE NOTE - CHIEF COMPLAINT QUOTE
Pt presents to ED by EMS C/O dizziness, nausea, unsteady gait starting at 1AM this morning. Pt neuro intact upon arrival. Denies numbness, tingling, facial droop, vision/ speech changes. Stroke Code called.

## 2023-06-06 ENCOUNTER — TRANSCRIPTION ENCOUNTER (OUTPATIENT)
Age: 88
End: 2023-06-06

## 2023-06-06 LAB
A1C WITH ESTIMATED AVERAGE GLUCOSE RESULT: 5.8 % — HIGH (ref 4–5.6)
ANION GAP SERPL CALC-SCNC: 12 MMOL/L — SIGNIFICANT CHANGE UP (ref 5–17)
BUN SERPL-MCNC: 12 MG/DL — SIGNIFICANT CHANGE UP (ref 7–23)
CALCIUM SERPL-MCNC: 8.6 MG/DL — SIGNIFICANT CHANGE UP (ref 8.4–10.5)
CHLORIDE SERPL-SCNC: 104 MMOL/L — SIGNIFICANT CHANGE UP (ref 96–108)
CHOLEST SERPL-MCNC: 165 MG/DL — SIGNIFICANT CHANGE UP
CO2 SERPL-SCNC: 22 MMOL/L — SIGNIFICANT CHANGE UP (ref 22–31)
CREAT SERPL-MCNC: 0.86 MG/DL — SIGNIFICANT CHANGE UP (ref 0.5–1.3)
EGFR: 66 ML/MIN/1.73M2 — SIGNIFICANT CHANGE UP
ESTIMATED AVERAGE GLUCOSE: 120 MG/DL — HIGH (ref 68–114)
GLUCOSE SERPL-MCNC: 91 MG/DL — SIGNIFICANT CHANGE UP (ref 70–99)
HCT VFR BLD CALC: 50 % — HIGH (ref 34.5–45)
HDLC SERPL-MCNC: 61 MG/DL — SIGNIFICANT CHANGE UP
HGB BLD-MCNC: 16.3 G/DL — HIGH (ref 11.5–15.5)
LIPID PNL WITH DIRECT LDL SERPL: 89 MG/DL — SIGNIFICANT CHANGE UP
MAGNESIUM SERPL-MCNC: 2.1 MG/DL — SIGNIFICANT CHANGE UP (ref 1.6–2.6)
MCHC RBC-ENTMCNC: 31.2 PG — SIGNIFICANT CHANGE UP (ref 27–34)
MCHC RBC-ENTMCNC: 32.6 GM/DL — SIGNIFICANT CHANGE UP (ref 32–36)
MCV RBC AUTO: 95.8 FL — SIGNIFICANT CHANGE UP (ref 80–100)
NON HDL CHOLESTEROL: 104 MG/DL — SIGNIFICANT CHANGE UP
NRBC # BLD: 0 /100 WBCS — SIGNIFICANT CHANGE UP (ref 0–0)
PHOSPHATE SERPL-MCNC: 3.7 MG/DL — SIGNIFICANT CHANGE UP (ref 2.5–4.5)
PLATELET # BLD AUTO: 116 K/UL — LOW (ref 150–400)
POTASSIUM SERPL-MCNC: 4 MMOL/L — SIGNIFICANT CHANGE UP (ref 3.5–5.3)
POTASSIUM SERPL-SCNC: 4 MMOL/L — SIGNIFICANT CHANGE UP (ref 3.5–5.3)
RBC # BLD: 5.22 M/UL — HIGH (ref 3.8–5.2)
RBC # FLD: 13.1 % — SIGNIFICANT CHANGE UP (ref 10.3–14.5)
SODIUM SERPL-SCNC: 138 MMOL/L — SIGNIFICANT CHANGE UP (ref 135–145)
TRIGL SERPL-MCNC: 76 MG/DL — SIGNIFICANT CHANGE UP
TSH SERPL-MCNC: 1.74 UIU/ML — SIGNIFICANT CHANGE UP (ref 0.27–4.2)
VIT B12 SERPL-MCNC: 743 PG/ML — SIGNIFICANT CHANGE UP (ref 232–1245)
WBC # BLD: 9.22 K/UL — SIGNIFICANT CHANGE UP (ref 3.8–10.5)
WBC # FLD AUTO: 9.22 K/UL — SIGNIFICANT CHANGE UP (ref 3.8–10.5)

## 2023-06-06 PROCEDURE — 99222 1ST HOSP IP/OBS MODERATE 55: CPT

## 2023-06-06 PROCEDURE — 70551 MRI BRAIN STEM W/O DYE: CPT | Mod: 26

## 2023-06-06 RX ORDER — POLYETHYLENE GLYCOL 3350 17 G/17G
17 POWDER, FOR SOLUTION ORAL DAILY
Refills: 0 | Status: DISCONTINUED | OUTPATIENT
Start: 2023-06-06 | End: 2023-06-11

## 2023-06-06 RX ORDER — LABETALOL HCL 100 MG
5 TABLET ORAL ONCE
Refills: 0 | Status: COMPLETED | OUTPATIENT
Start: 2023-06-06 | End: 2023-06-06

## 2023-06-06 RX ORDER — MECLIZINE HCL 12.5 MG
12.5 TABLET ORAL THREE TIMES A DAY
Refills: 0 | Status: DISCONTINUED | OUTPATIENT
Start: 2023-06-06 | End: 2023-06-10

## 2023-06-06 RX ORDER — SODIUM CHLORIDE 9 MG/ML
1000 INJECTION INTRAMUSCULAR; INTRAVENOUS; SUBCUTANEOUS
Refills: 0 | Status: DISCONTINUED | OUTPATIENT
Start: 2023-06-06 | End: 2023-06-06

## 2023-06-06 RX ORDER — DIAZEPAM 5 MG
1 TABLET ORAL EVERY 6 HOURS
Refills: 0 | Status: DISCONTINUED | OUTPATIENT
Start: 2023-06-06 | End: 2023-06-08

## 2023-06-06 RX ORDER — SENNA PLUS 8.6 MG/1
2 TABLET ORAL AT BEDTIME
Refills: 0 | Status: DISCONTINUED | OUTPATIENT
Start: 2023-06-06 | End: 2023-06-11

## 2023-06-06 RX ORDER — AMLODIPINE BESYLATE 2.5 MG/1
5 TABLET ORAL DAILY
Refills: 0 | Status: DISCONTINUED | OUTPATIENT
Start: 2023-06-06 | End: 2023-06-06

## 2023-06-06 RX ADMIN — Medication 12.5 MILLIGRAM(S): at 09:12

## 2023-06-06 RX ADMIN — Medication 12.5 MILLIGRAM(S): at 21:00

## 2023-06-06 RX ADMIN — SENNA PLUS 2 TABLET(S): 8.6 TABLET ORAL at 21:00

## 2023-06-06 RX ADMIN — APIXABAN 2.5 MILLIGRAM(S): 2.5 TABLET, FILM COATED ORAL at 06:30

## 2023-06-06 RX ADMIN — Medication 5 MILLIGRAM(S): at 04:51

## 2023-06-06 RX ADMIN — APIXABAN 2.5 MILLIGRAM(S): 2.5 TABLET, FILM COATED ORAL at 17:35

## 2023-06-06 RX ADMIN — ATORVASTATIN CALCIUM 80 MILLIGRAM(S): 80 TABLET, FILM COATED ORAL at 21:01

## 2023-06-06 RX ADMIN — Medication 12.5 MILLIGRAM(S): at 15:37

## 2023-06-06 NOTE — OCCUPATIONAL THERAPY INITIAL EVALUATION ADULT - RANGE OF MOTION EXAMINATION, LOWER EXTREMITY
bilateral LE Active ROM was WFL  (within functional limits)/bilateral LE Passive ROM was WFL  (within functional limits)
intermittent/dull

## 2023-06-06 NOTE — PHYSICAL THERAPY INITIAL EVALUATION ADULT - PERTINENT HX OF CURRENT PROBLEM, REHAB EVAL
88y Female with PMHx of HTN (patient report this to be intermittent, not on any medications), maintains a vegan diet presents to the ED with vertigo with room spinning since 6/5 0000. The patient awoke at midnight last night and began to have room spinning and severe dizziness with nausea with any movement. She was afraid to move but called out to her  who couldn't hear her as he was sleeping in another room. She got little sleep but awoke this morning with persistent dizziness with nausea and room spinning with associated nausea and vomiting with any movement or changes in position but was asymptomatic at rest. Stroke code called on arrival to the ED. NIHSS 0. Exam significant for right beating nystagmus. Found to be in A fib on EKG, hypertensive with systolic BP in the 200's. Given 5mg IV lopressor with good effect. CTH negative for any acute intracranial pathology. CTA negative for any large vessel occlusion or high grade stenosis. CTP negative for perfusion deficit. Patient out of the window for TNK, not a candidate for mechanical thrombectomy. Of note, patient has not seen a primary care doctor since prior to the pandemic but has no know atrial fibrillation or cardiac issues.

## 2023-06-06 NOTE — PHYSICAL THERAPY INITIAL EVALUATION ADULT - MODALITIES TREATMENT COMMENTS
Cranial Nerves II - XII: II: PERRLA; visual fields are full to confrontation III, IV, VI: EOMI, no nystagmus appreciated V: facial sensation intact to light touch V1-V3 b/l VII: no ptosis, no facial droop, symmetric eyebrow raise and closure VIII: hearing intact to finger rub b/l  XI: head turning and shoulder shrug intact b/l XII: tongue protrusion midline  Vision: Tracking and quadrants intact

## 2023-06-06 NOTE — OCCUPATIONAL THERAPY INITIAL EVALUATION ADULT - GENERAL OBSERVATIONS, REHAB EVAL
Pt's RN Ana Maria aware of intent to eval/tx; cleared Pt. Pt received in supine - +telemetry, heplock, bed alarm, scds. Pt's family at bedside. PT Ahmet present. Pt agreeable to OT.

## 2023-06-06 NOTE — OCCUPATIONAL THERAPY INITIAL EVALUATION ADULT - ADDITIONAL COMMENTS
Pt lives w/ spouse in apt w/ elevator access. Pt states that she was independent prior to incident w/o prior use of AEs/DMEs.

## 2023-06-06 NOTE — PROGRESS NOTE ADULT - SUBJECTIVE AND OBJECTIVE BOX
Neurology Stroke Progress Note    INTERVAL HPI/OVERNIGHT EVENTS:  Patient seen and examined.      MEDICATIONS  (STANDING):  apixaban 2.5 milliGRAM(s) Oral every 12 hours  atorvastatin 80 milliGRAM(s) Oral at bedtime    MEDICATIONS  (PRN):  meclizine 12.5 milliGRAM(s) Oral every 8 hours PRN Dizziness  ondansetron Injectable 4 milliGRAM(s) IV Push every 6 hours PRN Nausea and/or Vomiting      Allergies    penicillin (Anaphylaxis)    Intolerances        Vital Signs Last 24 Hrs  T(C): 36.7 (2023 19:00), Max: 36.7 (2023 19:00)  T(F): 98 (2023 19:00), Max: 98 (2023 19:00)  HR: 68 (2023 06:21) (68 - 125)  BP: 135/60 (2023 06:21) (135/60 - 213/100)  BP(mean): 86 (2023 06:21) (86 - 124)  RR: 13 (2023 06:21) (13 - 22)  SpO2: 94% (2023 06:21) (94% - 98%)    Parameters below as of 2023 06:21  Patient On (Oxygen Delivery Method): room air      Physical Exam:  General: No acute distress, awake and alert  Cardiovascular: Regular rate and rhythm, no murmurs, rubs, or gallops. No bruits  Pulmonary: Anterior breath sounds clear bilaterally, no crackles or wheezing. No use of accessory muscles  GI: Abdomen soft, non-tender, non-distended    Neurologic:  -Mental status: Awake, alert, oriented to person, place, and time. Speech is fluent with intact naming, repetition, and comprehension, no dysarthria. Recent and remote memory intact. Follows commands. Attention/concentration intact.   -Cranial nerves:   II: Visual fields are full to confrontation.  III, IV, VI: Extraocular movements are intact persistent right beating nystagmus noted. Pupils equally round and reactive to light  V:  Facial sensation V1-V3 equal and intact   VII: Face is symmetric with normal smile  Motor: Normal bulk and tone. No pronator drift. Strength bilateral upper extremity 5/5, bilateral lower extremities 5/5.  Rapid alternating movements intact and symmetric  Sensation: Intact to light touch bilaterally. No neglect or extinction on double simultaneous testing.  Coordination: No dysmetria on finger-to-nose bilaterally  Gait: Deferred    LABS:                        16.3   9.22  )-----------( 116      ( 2023 05:30 )             50.0     06-06    138  |  104  |  12  ----------------------------<  91  4.0   |  22  |  0.86    Ca    8.6      2023 05:30  Phos  3.7     06-06  Mg     2.1     06-06    TPro  8.0  /  Alb  4.7  /  TBili  0.9  /  DBili  x   /  AST  28  /  ALT  17  /  AlkPhos  178<H>  06-05    PT/INR - ( 2023 13:08 )   PT: see note sec;   INR: see note         PTT - ( 2023 13:08 )  PTT:see note sec  Urinalysis Basic - ( 2023 16:28 )    Color: Yellow / Appearance: Clear / S.025 / pH: x  Gluc: x / Ketone: 15 mg/dL  / Bili: Negative / Urobili: 0.2 E.U./dL   Blood: x / Protein: >=300 mg/dL / Nitrite: NEGATIVE   Leuk Esterase: NEGATIVE / RBC: 5-10 /HPF / WBC < 5 /HPF   Sq Epi: x / Non Sq Epi: x / Bacteria: None /HPF        RADIOLOGY & ADDITIONAL TESTS:    reviewed Neurology Stroke Progress Note    INTERVAL HPI/OVERNIGHT EVENTS:  Patient seen and examined. SBP 180s-->90s after working with PT. Feels faint however neuro exam stable. Overnight received labetalol PRN for SBP 190s. Persistently dizzy.    MEDICATIONS  (STANDING):  apixaban 2.5 milliGRAM(s) Oral every 12 hours  atorvastatin 80 milliGRAM(s) Oral at bedtime    MEDICATIONS  (PRN):  meclizine 12.5 milliGRAM(s) Oral every 8 hours PRN Dizziness  ondansetron Injectable 4 milliGRAM(s) IV Push every 6 hours PRN Nausea and/or Vomiting      Allergies    penicillin (Anaphylaxis)    Intolerances        Vital Signs Last 24 Hrs  T(C): 36.7 (2023 19:00), Max: 36.7 (2023 19:00)  T(F): 98 (2023 19:00), Max: 98 (2023 19:00)  HR: 68 (2023 06:21) (68 - 125)  BP: 135/60 (2023 06:21) (135/60 - 213/100)  BP(mean): 86 (2023 06:21) (86 - 124)  RR: 13 (2023 06:21) (13 - 22)  SpO2: 94% (2023 06:21) (94% - 98%)    Parameters below as of 2023 06:21  Patient On (Oxygen Delivery Method): room air      Physical Exam:  General: No acute distress, awake and alert  Cardiovascular: Regular rate and rhythm, no murmurs, rubs, or gallops. No bruits  Pulmonary: Anterior breath sounds clear bilaterally, no crackles or wheezing. No use of accessory muscles  GI: Abdomen soft, non-tender, non-distended    Neurologic:  -Mental status: Awake, alert, oriented to person, place, and time. Speech is fluent with intact naming, repetition, and comprehension, no dysarthria. Recent and remote memory intact. Follows commands. Attention/concentration intact.   -Cranial nerves:   II: Visual fields are full to confrontation.  III, IV, VI: Extraocular movements are intact persistent right beating nystagmus noted. Pupils equally round and reactive to light  V:  Facial sensation V1-V3 equal and intact   VII: subtle R NLFF  Motor: Normal bulk and tone. No pronator drift. Strength bilateral upper extremity 5/5, bilateral lower extremities 5/5.  Rapid alternating movements intact and symmetric  Sensation: Intact to light touch bilaterally. No neglect or extinction on double simultaneous testing.  Coordination: No dysmetria on finger-to-nose bilaterally  Gait: Deferred    LABS:                        16.3   9.22  )-----------( 116      ( 2023 05:30 )             50.0     06-06    138  |  104  |  12  ----------------------------<  91  4.0   |  22  |  0.86    Ca    8.6      2023 05:30  Phos  3.7     06-06  Mg     2.1     06-06    TPro  8.0  /  Alb  4.7  /  TBili  0.9  /  DBili  x   /  AST  28  /  ALT  17  /  AlkPhos  178<H>  06-05    PT/INR - ( 2023 13:08 )   PT: see note sec;   INR: see note         PTT - ( 2023 13:08 )  PTT:see note sec  Urinalysis Basic - ( 2023 16:28 )    Color: Yellow / Appearance: Clear / S.025 / pH: x  Gluc: x / Ketone: 15 mg/dL  / Bili: Negative / Urobili: 0.2 E.U./dL   Blood: x / Protein: >=300 mg/dL / Nitrite: NEGATIVE   Leuk Esterase: NEGATIVE / RBC: 5-10 /HPF / WBC < 5 /HPF   Sq Epi: x / Non Sq Epi: x / Bacteria: None /HPF        RADIOLOGY & ADDITIONAL TESTS:    reviewed Neurology Stroke Progress Note    INTERVAL HPI/OVERNIGHT EVENTS:  Patient seen and examined. SBP 180s-->90s after working with PT. Feels faint however neuro exam stable. Persistently dizzy.    Overnight received labetalol PRN for SBP 190s.     MEDICATIONS  (STANDING):  apixaban 2.5 milliGRAM(s) Oral every 12 hours  atorvastatin 80 milliGRAM(s) Oral at bedtime    MEDICATIONS  (PRN):  meclizine 12.5 milliGRAM(s) Oral every 8 hours PRN Dizziness  ondansetron Injectable 4 milliGRAM(s) IV Push every 6 hours PRN Nausea and/or Vomiting      Allergies    penicillin (Anaphylaxis)    Intolerances        Vital Signs Last 24 Hrs  T(C): 36.7 (2023 19:00), Max: 36.7 (2023 19:00)  T(F): 98 (2023 19:00), Max: 98 (2023 19:00)  HR: 68 (2023 06:21) (68 - 125)  BP: 135/60 (2023 06:21) (135/60 - 213/100)  BP(mean): 86 (2023 06:21) (86 - 124)  RR: 13 (2023 06:21) (13 - 22)  SpO2: 94% (2023 06:21) (94% - 98%)    Parameters below as of 2023 06:21  Patient On (Oxygen Delivery Method): room air      Physical Exam:  General: No acute distress, awake and alert  Cardiovascular: Regular rate and rhythm, no murmurs, rubs, or gallops. No bruits  Pulmonary: Anterior breath sounds clear bilaterally, no crackles or wheezing. No use of accessory muscles  GI: Abdomen soft, non-tender, non-distended    Neurologic:  -Mental status: Awake, alert, oriented to person, place, and time. Speech is fluent with intact naming, repetition, and comprehension, no dysarthria. Recent and remote memory intact. Follows commands. Attention/concentration intact.   -Cranial nerves:   II: Visual fields are full to confrontation.  III, IV, VI: Extraocular movements are intact persistent right beating nystagmus noted. Pupils equally round and reactive to light  V:  Facial sensation V1-V3 equal and intact   VII: subtle R NLFF  Motor: Normal bulk and tone. No pronator drift. Strength bilateral upper extremity 5/5, bilateral lower extremities 5/5.  Rapid alternating movements intact and symmetric  Sensation: Intact to light touch bilaterally. No neglect or extinction on double simultaneous testing.  Coordination: No dysmetria on finger-to-nose bilaterally  Gait: Deferred    LABS:                        16.3   9.22  )-----------( 116      ( 2023 05:30 )             50.0     06-06    138  |  104  |  12  ----------------------------<  91  4.0   |  22  |  0.86    Ca    8.6      2023 05:30  Phos  3.7     06-06  Mg     2.1     06-06    TPro  8.0  /  Alb  4.7  /  TBili  0.9  /  DBili  x   /  AST  28  /  ALT  17  /  AlkPhos  178<H>  06-05    PT/INR - ( 2023 13:08 )   PT: see note sec;   INR: see note         PTT - ( 2023 13:08 )  PTT:see note sec  Urinalysis Basic - ( 2023 16:28 )    Color: Yellow / Appearance: Clear / S.025 / pH: x  Gluc: x / Ketone: 15 mg/dL  / Bili: Negative / Urobili: 0.2 E.U./dL   Blood: x / Protein: >=300 mg/dL / Nitrite: NEGATIVE   Leuk Esterase: NEGATIVE / RBC: 5-10 /HPF / WBC < 5 /HPF   Sq Epi: x / Non Sq Epi: x / Bacteria: None /HPF        RADIOLOGY & ADDITIONAL TESTS:    reviewed

## 2023-06-06 NOTE — CONSULT NOTE ADULT - ASSESSMENT
{\rtf1\[id number]\ansi\vgdrfbs4496\ftnbj\uc1\deff0  {\fonttbl{\f0 \fnil Segoe UI;}{\f1 \fnil \fcharset0 Segoe UI;}{\f2 \fnil Times New Kavon;}}  {\colortbl ;\nim797\urkjr265\mkgm676 ;\red0\green0\blue0 ;\red0\green0\aikk859 ;\red0\green0\blue0 ;}  {\stylesheet{\f0\fs20 Normal;}{\cs1 Default Paragraph Font;}{\cs2\f0\fs16 Line Number;}{\cs3\f2\fs24\ul\cf3 Hyperlink;}}  {\*\revtbl{Unknown;}}  \afulkm93804\oablsd37283\dffxb8197\ounhy6329\ncncn9873\uxmhe3037\boviqng460\xdvrjkj437\nogrowautofit\ubqaxx654\formshade\nofeaturethrottle1\dntblnsbdb\fet4\aendnotes\aftnnrlc\pgbrdrhead\pgbrdrfoot  \sectd\lojnbn54823\sclfae15465\guttersxn0\xcscvlny8915\tlazcbid5802\bumcikkr2833\amijlckm7908\gckoppu362\dbvlsdz225\sbkpage\pgncont\pgndec  \plain\plain\f0\fs24\ql\plain\f0\fs24\plain\f0\fs20\rqrr3775\hich\f0\dbch\f0\loch\f0\fs20 Neurology\par  \par  88 y o Female with PMHx of HTN (patient report this to be intermittent, not on any medications), maintains a vegan diet presents to the ED with vertigo with room spinning since last night with exacerbation of symptoms with any movement and relief of symptoms   with rest. Stroke code called on arrival to the ED. NIHSS 0. Exam significant for right beating nystagmus. Found to be in A fib on EKG, hypertensive with systolic BP in the 200's. Given 5mg IV lopressor with good effect. CTH negative for any acute intracranial   pathology. CTA negative for any large vessel occlusion or high grade stenosis. CTP negative for perfusion deficit. Patient out of the window for TNK, not a candidate for mechanical thrombectomy. Although suspicion for stroke is low given relief of symptoms   at rest, given new onset Afib patient will be admitted to stroke service for further workup. \par  \par  Neuro\par  #CVA workup\par  - continue Eliquis 2.5mg BID\par  - continue atorvastatin 80mg daily\par  - q4hr stroke neuro checks and vitals\par  - obtain MRI Brain without contrast\par  - Stroke Code HCT Results: negative\par  - Stroke Code CTA Results: negative\par  - Stroke education\par  \par  #Vertigo\par  - Meclizine 12.5mg q8hr PRN\par  - Zofran 4mg q6hr PRN\par  - NS @ 75cc/hr\par  \par  Cards\par  #HTN\par  - permissive hypertension, Goal -180\par  - patient not on any hypertensive meds at home\par  - obtain TTE \par  - Stroke Code EKG Results: Atrial fibrillation w/ RVR \par  \par  #New onset Afib\par  - Received one dose of IV lopressor 5mg in ED for RVR\par  - Start Eliquis 2.5mg BID\par  - Consider cardiology consult if patient goes into RVR\par  \par  \par  #HLD\par  - high dose statin as above in CVA\par  - LDL results: pending\par  \par  Pulm\par  - call provider if SPO2 < 94%\par  \par  GI\par  #Nutrition/Fluids/Electrolytes \par  - replete K<4 and Mg <2\par  - Diet: Vegan\par  - IVF: NS @ 75cc/hr\par  \par  Renal\par  - Trend BMP\par  \par  Infectious Disease\par  - Stroke Code CXR results: No evidence of acute cardiopulmonary disease\par  \par  Endocrine\par  - A1C results: pending\par  \par  - TSH results: pending\par  \par  DVT Prophylaxis\par  - c/w Eliquis \par  - SCDs for DVT prophylaxis \par  \par  \par  IDR Goals: Goals reviewed at interdisciplinary rounds with case management, social work, physical therapy, occupational therapy, and speech language pathology. \par  Please see specific therapy  notes for in depth goals.\par  Dispo: Pending PM&R   ,  PT/OT evals\par   \par  Discussed daily hospital plans and goals with patient and family at bedside. \par  \par  Discussed with Neurology Attending Dr. Katerin Monet\par  \par  \plain\f1\fs20\agmq7543\hich\f1\dbch\f1\loch\f1\cf2\fs20\strike\plain\f1\fs20\bczt9944\hich\f1\dbch\f1\loch\f1\cf2\fs20\plain\f0\fs20\dkmk5572\hich\f0\dbch\f0\loch\f0\fs20\par  \par  \par  }

## 2023-06-06 NOTE — OCCUPATIONAL THERAPY INITIAL EVALUATION ADULT - PERTINENT HX OF CURRENT PROBLEM, REHAB EVAL
88y Female with PMHx of HTN (patient report this to be intermittent, not on any medications), maintains a vegan diet presents to the ED with vertigo with room spinning since last night with exacerbation of symptoms with any movement and relief of symptoms with rest. Stroke code called on arrival to the ED. NIHSS 0. Exam significant for right beating nystagmus. Found to be in A fib on EKG, hypertensive with systolic BP in the 200's. Given 5mg IV lopressor with good effect. CTH negative for any acute intracranial pathology. CTA negative for any large vessel occlusion or high grade stenosis. CTP negative for perfusion deficit.

## 2023-06-06 NOTE — PHYSICAL THERAPY INITIAL EVALUATION ADULT - ADDITIONAL COMMENTS
Pt states she was IND PTA, denies use of DME. Pt lives in an elevator building no RUDY Pt states she was IND PTA, denies use of DME. Pt lives in an elevator building no RUDY. Pt is R hand dominant

## 2023-06-06 NOTE — PATIENT PROFILE ADULT - FALL HARM RISK - HARM RISK INTERVENTIONS
Assistance with ambulation/Assistance OOB with selected safe patient handling equipment/Communicate Risk of Fall with Harm to all staff/Monitor gait and stability/Reinforce activity limits and safety measures with patient and family/Sit up slowly, dangle for a short time, stand at bedside before walking/Tailored Fall Risk Interventions/Visual Cue: Yellow wristband and red socks/Bed in lowest position, wheels locked, appropriate side rails in place/Call bell, personal items and telephone in reach/Instruct patient to call for assistance before getting out of bed or chair/Non-slip footwear when patient is out of bed/[place] to call system/Physically safe environment - no spills, clutter or unnecessary equipment/Purposeful Proactive Rounding/Room/bathroom lighting operational, light cord in reach

## 2023-06-06 NOTE — PROGRESS NOTE ADULT - ASSESSMENT
88y Female with PMHx of HTN (patient report this to be intermittent, not on any medications), maintains a vegan diet presents to the ED with vertigo with room spinning with exacerbation of symptoms with any movement and relief of symptoms with rest. Stroke code called on arrival to the ED. NIHSS 0. Exam significant for right beating nystagmus. Found to be in A fib on EKG, hypertensive with systolic BP in the 200's. Given 5mg IV lopressor with good effect. CTH negative for any acute intracranial pathology. CTA negative for any large vessel occlusion or high grade stenosis. CTP negative for perfusion deficit. Patient out of the window for TNK, not a candidate for mechanical thrombectomy. Although suspicion for stroke is low given relief of symptoms at rest, given new onset Afib, admitted to stroke service for further workup.     Neuro  #CVA workup  - continue Eliquis 2.5mg BID  - continue atorvastatin 80mg daily  - q4hr stroke neuro checks and vitals  - obtain MRI Brain without contrast  - Stroke Code HCT Results: negative  - Stroke Code CTA Results: negative  - Stroke education    #Vertigo  - Meclizine 12.5mg q8hr PRN  - Zofran 4mg q6hr PRN  - NS @ 75cc/hr    Cards  #HTN  - permissive hypertension, Goal -180  - patient not on any hypertensive meds at home  - obtain TTE   - Stroke Code EKG Results: Atrial fibrillation w/ RVR     #New onset Afib  - Received one dose of IV lopressor 5mg in ED for RVR  - Start Eliquis 2.5mg BID  - Consider cardiology consult if patient goes into RVR      #HLD  - high dose statin as above in CVA  - LDL results: 89    Pulm  - call provider if SPO2 < 94%    GI  #Nutrition/Fluids/Electrolytes   - replete K<4 and Mg <2  - Diet: Vegan  - IVF: NS @ 75cc/hr    Renal  - Trend BMP    Infectious Disease  - Stroke Code CXR results: No evidence of acute cardiopulmonary disease    Endocrine  - A1C results: 5.8    - TSH results: pending    DVT Prophylaxis  - c/w Eliquis   - SCDs for DVT prophylaxis       IDR Goals: Goals reviewed at interdisciplinary rounds with case management, social work, physical therapy, occupational therapy, and speech language pathology.   Please see specific therapy  notes for in depth goals.    Dispo: Pending PT/OT evals     Discussed daily hospital plans and goals with patient and family at bedside.     Discussed with Neurology Attending Dr. Katerin Monet   88y Female with PMHx of HTN (patient report this to be intermittent, not on any medications), maintains a vegan diet presents to the ED with vertigo with room spinning with exacerbation of symptoms with any movement and relief of symptoms with rest. Stroke code called on arrival to the ED. NIHSS 0. Exam significant for right beating nystagmus. Found to be in A fib on EKG, hypertensive with systolic BP in the 200's. Given 5mg IV lopressor with good effect. CTH negative for any acute intracranial pathology. CTA negative for any large vessel occlusion or high grade stenosis. CTP negative for perfusion deficit. Patient out of the window for TNK, not a candidate for mechanical thrombectomy. Although suspicion for stroke is low given relief of symptoms at rest, given new onset Afib, admitted to stroke service for further workup.     Neuro  #CVA workup  - continue Eliquis 2.5mg BID  - continue atorvastatin 80mg daily  - q4hr stroke neuro checks and vitals  - obtain MRI Brain without contrast  - Stroke Code HCT Results: negative  - Stroke Code CTA Results: negative  - Stroke education    #Vertigo  - Meclizine 12.5mg TID standing  - Valium 1mg q6h PRN if persistently dizzy even with standing meclizine  - Zofran 4mg q6hr PRN    Cards  #HTN  - permissive hypertension, Goal -180  - patient not on any hypertensive meds at home  - obtain TTE   - Stroke Code EKG Results: Atrial fibrillation w/ RVR     #New onset Afib  - Received one dose of IV lopressor 5mg in ED for RVR  - Start Eliquis 2.5mg BID  - Consider cardiology consult if patient goes into RVR      #HLD  - high dose statin as above in CVA  - LDL results: 89    Pulm  - call provider if SPO2 < 94%    GI  #Nutrition/Fluids/Electrolytes   - replete K<4 and Mg <2  - Diet: Vegan  - IVF: NS @ 75cc/hr    Renal  - Trend BMP    Infectious Disease  - Stroke Code CXR results: No evidence of acute cardiopulmonary disease    Endocrine  - A1C results: 5.8    - TSH results: pending    DVT Prophylaxis  - c/w Eliquis   - SCDs for DVT prophylaxis       IDR Goals: Goals reviewed at interdisciplinary rounds with case management, social work, physical therapy, occupational therapy, and speech language pathology.   Please see specific therapy  notes for in depth goals.    Dispo: Pending PT/OT evals     Discussed daily hospital plans and goals with patient and family at bedside.     Discussed with Neurology Attending Dr. Katerin Monet   88y Female with PMHx of HTN (patient report this to be intermittent, not on any medications), maintains a vegan diet presents to the ED with vertigo with room spinning with exacerbation of symptoms with any movement and relief of symptoms with rest. Stroke code called on arrival to the ED. NIHSS 0. Exam significant for right beating nystagmus. Found to be in A fib on EKG, hypertensive with systolic BP in the 200's. Given 5mg IV lopressor with good effect. CTH negative for any acute intracranial pathology. CTA negative for any large vessel occlusion or high grade stenosis. CTP negative for perfusion deficit. Patient out of the window for TNK, not a candidate for mechanical thrombectomy. Although suspicion for stroke is low given relief of symptoms at rest, given new onset Afib, admitted to stroke service for further workup.     Neuro  #CVA workup  - continue Eliquis 2.5mg BID  - continue atorvastatin 80mg daily  - q4hr stroke neuro checks and vitals  - obtain MRI Brain without contrast  - Stroke Code HCT Results: negative  - Stroke Code CTA Results: negative  - Stroke education    #Vertigo  - Meclizine 12.5mg TID standing  - Valium 1mg q6h PRN if persistently dizzy even with standing meclizine  - Zofran 4mg q6hr PRN    Cards  #HTN  - SBP up to 200  - patient not on any hypertensive meds at home  - obtain TTE   - Stroke Code EKG Results: Atrial fibrillation w/ RVR     #New onset Afib  - Received one dose of IV lopressor 5mg in ED for RVR  -c/w Eliquis 2.5mg BID  - Consider cardiology consult if patient goes into RVR      #HLD  - high dose statin as above in CVA  - LDL results: 89    Pulm  - call provider if SPO2 < 94%    GI  #Nutrition/Fluids/Electrolytes   - replete K<4 and Mg <2  - Diet: Vegan  - IVF: NS @ 75cc/hr    Renal  - Trend BMP    Infectious Disease  - Stroke Code CXR results: No evidence of acute cardiopulmonary disease    Endocrine  - A1C results: 5.8  - TSH results: 1.740    Heme  #elevated RBC, hgb, hct  - f/u RONEY protein     DVT Prophylaxis  - c/w Eliquis   - SCDs for DVT prophylaxis       IDR Goals: Goals reviewed at interdisciplinary rounds with case management, social work, physical therapy, occupational therapy, and speech language pathology.   Please see specific therapy  notes for in depth goals.    Dispo: pending PT/OT     Discussed daily hospital plans and goals with patient at bedside.     Discussed with Neurology Attending Dr. Katerin Monet   88y Female with PMHx of HTN (patient report this to be intermittent, not on any medications), maintains a vegan diet presents to the ED with vertigo with room spinning with exacerbation of symptoms with any movement and relief of symptoms with rest. Stroke code called on arrival to the ED. NIHSS 0. Exam significant for right beating nystagmus. Found to be in A fib on EKG, hypertensive with systolic BP in the 200's. Given 5mg IV lopressor with good effect. CTH negative for any acute intracranial pathology. CTA negative for any large vessel occlusion or high grade stenosis. CTP negative for perfusion deficit. Patient out of the window for TNK, not a candidate for mechanical thrombectomy. Although suspicion for stroke is low given relief of symptoms at rest, given new onset Afib, admitted to stroke service for further workup.     Neuro  #CVA workup  - continue Eliquis 2.5mg BID  - continue atorvastatin 80mg daily  - q4hr stroke neuro checks and vitals  - obtain MRI Brain without contrast  - Stroke Code HCT Results: negative  - Stroke Code CTA Results: negative  - Stroke education    #Vertigo  - Meclizine 12.5mg TID standing  - Valium 1mg q6h PRN if persistently dizzy even with standing meclizine  - Zofran 4mg q6hr PRN    Cards  #HTN  - sBP goal 120-200  - patient not on any hypertensive meds at home  - obtain TTE   - Stroke Code EKG Results: Atrial fibrillation w/ RVR     #New onset Afib  - Received one dose of IV lopressor 5mg in ED for RVR  -c/w Eliquis 2.5mg BID  - Consider cardiology consult if patient goes into RVR      #HLD  - high dose statin as above in CVA  - LDL results: 89    Pulm  - call provider if SPO2 < 94%    GI  #Nutrition/Fluids/Electrolytes   - replete K<4 and Mg <2  - Diet: Vegan  - IVF: NS @ 75cc/hr    Renal  - Trend BMP    Infectious Disease  - Stroke Code CXR results: No evidence of acute cardiopulmonary disease    Endocrine  - A1C results: 5.8  - TSH results: 1.740    Heme  #elevated RBC, hgb, hct  - f/u RONEY protein     DVT Prophylaxis  - c/w Eliquis   - SCDs for DVT prophylaxis       IDR Goals: Goals reviewed at interdisciplinary rounds with case management, social work, physical therapy, occupational therapy, and speech language pathology.   Please see specific therapy  notes for in depth goals.    Dispo: pending PT/OT     Discussed daily hospital plans and goals with patient at bedside.     Discussed with Neurology Attending Dr. Katerin Monet

## 2023-06-06 NOTE — CONSULT NOTE ADULT - ASSESSMENT
88F with PMH of HTN presenting with vertigo/dizziness and hypertensive, but not within TNK window.  Also noted to be in new atrial fibrillation.  started on anticoagulation and admitted to the stroke service for further workup and medicine consultation.     #Vertigo  #CVA workup  #HTN  #New atrial fibrillation  - started on meclizine standing  - prn valium should meclizine not work  - pending TTE and MRI  - monitor on telemetry  - BP control per primary team  - if afib with RVR, would start on rate control with metoprolol 12.5mg BID, and can uptitrate.    - if CVA discovered on MRI, will need to allow permissive hypertension  - check lipid panel, TSH and HbA1C    #Polycythemia  #Thrombocytopenia  - ?increased viscosity as cause of symptoms  - provide more IV hydration and JAK2  - repeat labs in the morning    PT/OT consult  Eliquis for anticoagulation  Dispo pending clinical improvement  Bowel regimen    Plan of care discussed with stroke team.    >70 minutes spent on this encounter, including face to face with patient, care coordination and documentation.

## 2023-06-06 NOTE — OCCUPATIONAL THERAPY INITIAL EVALUATION ADULT - MD ORDER
Dizziness with room spinning with new onset Afib  S/P Stroke Code  CTs negative  Pending Brain MRI and CVA w/u

## 2023-06-07 LAB
ANION GAP SERPL CALC-SCNC: 10 MMOL/L — SIGNIFICANT CHANGE UP (ref 5–17)
BUN SERPL-MCNC: 10 MG/DL — SIGNIFICANT CHANGE UP (ref 7–23)
CALCIUM SERPL-MCNC: 9.2 MG/DL — SIGNIFICANT CHANGE UP (ref 8.4–10.5)
CHLORIDE SERPL-SCNC: 109 MMOL/L — HIGH (ref 96–108)
CO2 SERPL-SCNC: 23 MMOL/L — SIGNIFICANT CHANGE UP (ref 22–31)
CREAT SERPL-MCNC: 0.67 MG/DL — SIGNIFICANT CHANGE UP (ref 0.5–1.3)
EGFR: 85 ML/MIN/1.73M2 — SIGNIFICANT CHANGE UP
GLUCOSE SERPL-MCNC: 118 MG/DL — HIGH (ref 70–99)
HCT VFR BLD CALC: 51.8 % — HIGH (ref 34.5–45)
HGB BLD-MCNC: 17.3 G/DL — HIGH (ref 11.5–15.5)
MAGNESIUM SERPL-MCNC: 2.1 MG/DL — SIGNIFICANT CHANGE UP (ref 1.6–2.6)
MCHC RBC-ENTMCNC: 31.7 PG — SIGNIFICANT CHANGE UP (ref 27–34)
MCHC RBC-ENTMCNC: 33.4 GM/DL — SIGNIFICANT CHANGE UP (ref 32–36)
MCV RBC AUTO: 95 FL — SIGNIFICANT CHANGE UP (ref 80–100)
NRBC # BLD: 0 /100 WBCS — SIGNIFICANT CHANGE UP (ref 0–0)
PHOSPHATE SERPL-MCNC: 2.7 MG/DL — SIGNIFICANT CHANGE UP (ref 2.5–4.5)
PLATELET # BLD AUTO: 128 K/UL — LOW (ref 150–400)
POTASSIUM SERPL-MCNC: 4 MMOL/L — SIGNIFICANT CHANGE UP (ref 3.5–5.3)
POTASSIUM SERPL-SCNC: 4 MMOL/L — SIGNIFICANT CHANGE UP (ref 3.5–5.3)
RBC # BLD: 5.45 M/UL — HIGH (ref 3.8–5.2)
RBC # FLD: 13.2 % — SIGNIFICANT CHANGE UP (ref 10.3–14.5)
SODIUM SERPL-SCNC: 142 MMOL/L — SIGNIFICANT CHANGE UP (ref 135–145)
WBC # BLD: 13.54 K/UL — HIGH (ref 3.8–10.5)
WBC # FLD AUTO: 13.54 K/UL — HIGH (ref 3.8–10.5)

## 2023-06-07 PROCEDURE — 99232 SBSQ HOSP IP/OBS MODERATE 35: CPT

## 2023-06-07 PROCEDURE — 99233 SBSQ HOSP IP/OBS HIGH 50: CPT

## 2023-06-07 RX ORDER — AMLODIPINE BESYLATE 2.5 MG/1
5 TABLET ORAL DAILY
Refills: 0 | Status: DISCONTINUED | OUTPATIENT
Start: 2023-06-07 | End: 2023-06-10

## 2023-06-07 RX ORDER — METOPROLOL TARTRATE 50 MG
5 TABLET ORAL ONCE
Refills: 0 | Status: COMPLETED | OUTPATIENT
Start: 2023-06-07 | End: 2023-06-07

## 2023-06-07 RX ORDER — HYDRALAZINE HCL 50 MG
5 TABLET ORAL ONCE
Refills: 0 | Status: COMPLETED | OUTPATIENT
Start: 2023-06-07 | End: 2023-06-07

## 2023-06-07 RX ORDER — LISINOPRIL 2.5 MG/1
5 TABLET ORAL DAILY
Refills: 0 | Status: DISCONTINUED | OUTPATIENT
Start: 2023-06-07 | End: 2023-06-07

## 2023-06-07 RX ORDER — ATORVASTATIN CALCIUM 80 MG/1
10 TABLET, FILM COATED ORAL AT BEDTIME
Refills: 0 | Status: DISCONTINUED | OUTPATIENT
Start: 2023-06-07 | End: 2023-06-11

## 2023-06-07 RX ORDER — CARVEDILOL PHOSPHATE 80 MG/1
6.25 CAPSULE, EXTENDED RELEASE ORAL EVERY 12 HOURS
Refills: 0 | Status: DISCONTINUED | OUTPATIENT
Start: 2023-06-07 | End: 2023-06-08

## 2023-06-07 RX ADMIN — Medication 12.5 MILLIGRAM(S): at 13:05

## 2023-06-07 RX ADMIN — ATORVASTATIN CALCIUM 10 MILLIGRAM(S): 80 TABLET, FILM COATED ORAL at 21:00

## 2023-06-07 RX ADMIN — Medication 12.5 MILLIGRAM(S): at 21:01

## 2023-06-07 RX ADMIN — Medication 5 MILLIGRAM(S): at 20:38

## 2023-06-07 RX ADMIN — Medication 5 MILLIGRAM(S): at 00:25

## 2023-06-07 RX ADMIN — APIXABAN 2.5 MILLIGRAM(S): 2.5 TABLET, FILM COATED ORAL at 17:38

## 2023-06-07 RX ADMIN — Medication 12.5 MILLIGRAM(S): at 05:57

## 2023-06-07 RX ADMIN — APIXABAN 2.5 MILLIGRAM(S): 2.5 TABLET, FILM COATED ORAL at 05:57

## 2023-06-07 RX ADMIN — SENNA PLUS 2 TABLET(S): 8.6 TABLET ORAL at 21:00

## 2023-06-07 NOTE — CONSULT NOTE ADULT - ASSESSMENT
88F w/ untreated HTN, probable dementia p/w peripheral vertigo was found to be in a fib (new dx) and reported orthostatic hypotension.    #A fib - rates reasonable at present but needs chronic therapy for rate control  Recommend starting 6.25 of carvedilol bid  Agree w/ apixaban 2.5 bid age/weight.    #HTN - uncontrolled and getting PRN medications frequently.   Start coreg as above  Amlodipine 5mg to start  Minimize PRN pushes of hydral best optimized w/ PO meds and this may take time to take effect. --> iv anti-hypertensives/hydral may also be a reason for the significant orthostatic hypotensive response -- they act as pure arteriolar dilators.       #Orthostatic hypotension - 166/82 lying () to 158/74 sit (HR 98), 107/57 standing (). Unclear if pt is symptomatic with this.  Please repeat and assess symptoms as well  Lack of increase in HR may suggest a neurogenic etiology for the OH and recommend consideration of autonomic disorder per neurology team. However, as above would reassess when the pt is not getting iv pushes of hydral and antihypertensives.  Optimization of HTN is still recommended in setting of orthostatic hypotension   Pt appears euvolemic -- no need for IVF. advise encouraging PO intake/hydration.   88F w/ untreated HTN, probable dementia p/w peripheral vertigo was found to be in a fib (new dx) and orthostatic hypotension.    #A fib - rates reasonable at present but needs chronic therapy for rate control  Recommend starting 6.25 of carvedilol bid  Agree w/ apixaban 2.5 bid age/weight.    #HTN - uncontrolled and getting PRN medications frequently.   Start coreg as above  Amlodipine 5mg to start  Minimize PRN pushes of hydral best optimized w/ PO meds and this may take time to take effect. --> iv anti-hypertensives/hydral may also be a reason for the significant orthostatic hypotensive response -- they act as pure arteriolar dilators.       #Orthostatic hypotension - 166/82 lying () to 158/74 sit (HR 98), 107/57 standing (). Unclear if pt is symptomatic with this.  Please repeat and assess symptoms as well  Lack of increase in HR may suggest a neurogenic etiology for the OH and recommend consideration of autonomic disorder per neurology team. However, as above would reassess when the pt is not getting iv pushes of hydral and antihypertensives.  Optimization of HTN is still recommended in setting of orthostatic hypotension   Pt appears euvolemic -- no need for IVF. advise encouraging PO intake/hydration.   88F w/ untreated HTN, probable dementia p/w peripheral vertigo was found to be in a fib (new dx) and orthostatic hypotension.    #A fib - rates reasonable at present but needs chronic therapy for rate control  Recommend starting 6.25 of carvedilol bid  Agree w/ apixaban 2.5 bid age/weight.    #HTN - uncontrolled and getting PRN medications frequently.   Start coreg as above  Amlodipine 5mg to start  Minimize PRN pushes of hydral best optimized w/ PO meds and this may take time to take effect. --> iv anti-hypertensives/hydral may also be a reason for the significant orthostatic hypotensive response -- they act as pure arteriolar dilators.       #Orthostatic hypotension - 166/82 lying () to 158/74 sit (HR 98), 107/57 standing (). Unclear if pt is symptomatic with this.  Please repeat and assess symptoms as well  Lack of increase in HR may suggest a neurogenic etiology (vs due to the iv pushes of beta blockers) for the OH and recommend consideration of autonomic disorder per neurology team. However, as above would reassess when the pt is not getting iv pushes of hydral and antihypertensives.  Optimization of HTN is still recommended in setting of orthostatic hypotension   Pt appears euvolemic -- no need for IVF. advise encouraging PO intake/hydration.

## 2023-06-07 NOTE — PROVIDER CONTACT NOTE (CHANGE IN STATUS NOTIFICATION) - ACTION/TREATMENT ORDERED:
5mg metoprolol to be given as per PA in order to lower HR and Bp. Rechecks to follow. 5mg IV metoprolol to be given as per PA in order to lower HR and Bp. 15 min recheck to follow.

## 2023-06-07 NOTE — PROVIDER CONTACT NOTE (CHANGE IN STATUS NOTIFICATION) - ASSESSMENT
NIH=0. Afib. HR sustaining 120-130S. Pt vocalizes anxiety related to staying overnight in hospital. GISSEL Jean Baptiste aware, came to assess pt.

## 2023-06-07 NOTE — PROGRESS NOTE ADULT - SUBJECTIVE AND OBJECTIVE BOX
Neurology Stroke Progress Note    INTERVAL HPI/OVERNIGHT EVENTS:  Patient seen and examined. +orthostatic hypotension. Pressure in the 160s-180s when laying down, drops to 80s when standing. Denies dizziness, headache, lightheadedness when this happens. Patient and  both say this is new.     MEDICATIONS  (STANDING):  apixaban 2.5 milliGRAM(s) Oral every 12 hours  atorvastatin 10 milliGRAM(s) Oral at bedtime  meclizine 12.5 milliGRAM(s) Oral three times a day  polyethylene glycol 3350 17 Gram(s) Oral daily  senna 2 Tablet(s) Oral at bedtime    MEDICATIONS  (PRN):  diazepam    Tablet 1 milliGRAM(s) Oral every 6 hours PRN if persistently dizzy even with standing meclizine  ondansetron Injectable 4 milliGRAM(s) IV Push every 6 hours PRN Nausea and/or Vomiting      Allergies    penicillin (Anaphylaxis)    Intolerances        Vital Signs Last 24 Hrs  T(C): 36.8 (2023 13:40), Max: 36.9 (2023 04:09)  T(F): 98.2 (2023 13:40), Max: 98.5 (2023 04:09)  HR: 104 (2023 11:44) (84 - 117)  BP: 179/74 (2023 11:44) (134/64 - 198/104)  BP(mean): 123 (2023 11:44) (90 - 143)  RR: 18 (2023 11:44) (18 - 23)  SpO2: 97% (2023 11:44) (93% - 98%)    Parameters below as of 2023 11:44  Patient On (Oxygen Delivery Method): room air      Physical Exam:  General: No acute distress, awake and alert  Cardiovascular: Regular rate and rhythm, no murmurs, rubs, or gallops. No bruits  Pulmonary: Anterior breath sounds clear bilaterally, no crackles or wheezing. No use of accessory muscles  GI: Abdomen soft, non-tender, non-distended    Neurologic:  -Mental status: Awake, alert, oriented to person, place, and time. Speech is fluent with intact naming, repetition, and comprehension, no dysarthria. Recent and remote memory intact. Follows commands. Attention/concentration intact.   -Cranial nerves:   II: Visual fields are full to confrontation.  III, IV, VI: Extraocular movements are intact persistent right beating nystagmus noted. Pupils equally round and reactive to light  V:  Facial sensation V1-V3 equal and intact   VII: symmetric  Motor: Normal bulk and tone. No pronator drift. Strength bilateral upper extremity 5/5, bilateral lower extremities 5/5.  Rapid alternating movements intact and symmetric  Sensation: Intact to light touch bilaterally. No neglect or extinction on double simultaneous testing.  Coordination: No dysmetria on finger-to-nose bilaterally  Gait: Deferred    LABS:                        17.3   13.54 )-----------( 128      ( 2023 05:30 )             51.8     06-07    142  |  109<H>  |  10  ----------------------------<  118<H>  4.0   |  23  |  0.67    Ca    9.2      2023 05:30  Phos  2.7     06-07  Mg     2.1     06-07        Urinalysis Basic - ( 2023 16:28 )    Color: Yellow / Appearance: Clear / S.025 / pH: x  Gluc: x / Ketone: 15 mg/dL  / Bili: Negative / Urobili: 0.2 E.U./dL   Blood: x / Protein: >=300 mg/dL / Nitrite: NEGATIVE   Leuk Esterase: NEGATIVE / RBC: 5-10 /HPF / WBC < 5 /HPF   Sq Epi: x / Non Sq Epi: x / Bacteria: None /HPF        RADIOLOGY & ADDITIONAL TESTS:  < from: MR Head No Cont (23 @ 22:58) >  IMPRESSION: No evidence of recent ischemic injury.    < end of copied text >

## 2023-06-07 NOTE — PROGRESS NOTE ADULT - SUBJECTIVE AND OBJECTIVE BOX
Dizziness improved.  Really wants to walk around.     Remaining ROS negative       PHYSICAL EXAM:    General: sitting up in bed, pleasant and conversant  HEENT: NC/AT; MMM  Cardiovascular: +S1/S2, irr irr no mrg  Respiratory: CTA B/L; no W/R/R  Gastrointestinal: soft, NT/ND; +BSx4  Extremities: WWP; no edema or asymmetry  Neurological: mild rightward beating nystagmus, follows commands, speech is fluent  Psychiatric: pleasant mood and affect  Dermatologic: no appreciable wounds or damage to the skin    VITAL SIGNS:  Vital Signs Last 24 Hrs  T(C): 36.3 (07 Jun 2023 17:23), Max: 36.9 (07 Jun 2023 04:09)  T(F): 97.3 (07 Jun 2023 17:23), Max: 98.5 (07 Jun 2023 04:09)  HR: 100 (07 Jun 2023 20:53) (94 - 117)  BP: 141/64 (07 Jun 2023 20:53) (137/99 - 198/104)  BP(mean): 92 (07 Jun 2023 20:53) (92 - 143)  RR: 25 (07 Jun 2023 20:53) (18 - 25)  SpO2: 97% (07 Jun 2023 20:53) (94% - 98%)    Parameters below as of 07 Jun 2023 20:53  Patient On (Oxygen Delivery Method): room air          MEDICATIONS:  MEDICATIONS  (STANDING):  apixaban 2.5 milliGRAM(s) Oral every 12 hours  atorvastatin 10 milliGRAM(s) Oral at bedtime  meclizine 12.5 milliGRAM(s) Oral three times a day  polyethylene glycol 3350 17 Gram(s) Oral daily  senna 2 Tablet(s) Oral at bedtime    MEDICATIONS  (PRN):  diazepam    Tablet 1 milliGRAM(s) Oral every 6 hours PRN if persistently dizzy even with standing meclizine  ondansetron Injectable 4 milliGRAM(s) IV Push every 6 hours PRN Nausea and/or Vomiting      ALLERGIES:  Allergies    penicillin (Anaphylaxis)    Intolerances        LABS:                        17.3   13.54 )-----------( 128      ( 07 Jun 2023 05:30 )             51.8     06-07    142  |  109<H>  |  10  ----------------------------<  118<H>  4.0   |  23  |  0.67    Ca    9.2      07 Jun 2023 05:30  Phos  2.7     06-07  Mg     2.1     06-07          CAPILLARY BLOOD GLUCOSE          RADIOLOGY & ADDITIONAL TESTS: Reviewed. [de-identified] : 61 year old white female self referred with advanced pancreatic cancer presents for second opinion and to transfer her oncologic care . PMH is significant for hypertension , diagnosed with metastatic pancreatic cancer in May 2021 when she presented with diarrhea , indigestion and  progressive weight loss. PET scan showed mass involving neck and body of pancreas with regional lymph node encasing SMA in addition to left para aortic node in mid abdomen . 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 biliary duct dilatation due to peripancreatic and and [place] hepatis adenopathy . \par EUS with biopsy confirmed pancreatic adenocarcinoma , she was found with involvement of portal vein and CBD . ERCP was done with placement of metallic stent . \par She has received this far 5 cycles of chemotherapy ( likely FOLFIRINOX ) complicated with severe diarrhea , electrolyte imbalance , partially controlled with lomotil , stopped due to dizziness and loss of appetite . She lost nearly 40 lbs and complains of generalized weakness, no fever, abdominal or back pain , no itching . She takes pancreatic enzymes one with meals , ensure one daily in addition to mineral supplements . She ambulates without assistance and is  capable of limited self care .\par Meds : kcl , pancreatic enzymes . ( HCTZ was discontinued )

## 2023-06-07 NOTE — CONSULT NOTE ADULT - ATTENDING COMMENTS
Initial attending contact date  6/8/23    . See fellow note written above for details. I reviewed the fellow documentation. I have personally seen and examined this patient. I reviewed vitals, labs, medications, cardiac studies, and additional imaging. I agree with the above fellow's findings and plans as written above with the following additions/statements.    88F w/ untreated HTN, probable dementia p/w peripheral vertigo was found to be in a fib (new dx) and orthostatic hypotension.  - on tele rate controlled A fib in 70s. Agree with AC with lower dose eliquis   - BP labile on vitals, agree with coreg 3.125 mg bid. Would up titrate coreg prior to adding another agent ie amlodipine   -Recheck orthostatics today. If still positive and symptomatic, recommend assessing volume status. IVF if needed, compression stockings, abdominal binders. If no profound tachycardia with +orthostatics, could be neurogenic component of orthostatic hypotension   -ECHO pending  -Will cont to follow

## 2023-06-07 NOTE — CONSULT NOTE ADULT - SUBJECTIVE AND OBJECTIVE BOX
See below for stroke HPI:  "HPI: 88y Female with PMHx of HTN (patient report this to be intermittent, not on any medications), maintains a vegan diet presents to the ED with vertigo with room spinning since  0000. The patient awoke at midnight last night and began to have room spinning and severe dizziness with nausea with any movement. She was afraid to move but called out to her  who couldn't hear her as he was sleeping in another room. She got little sleep but awoke this morning with persistent dizziness with nausea and room spinning with associated nausea and vomiting with any movement or changes in position but was asymptomatic at rest. Stroke code called on arrival to the ED. NIHSS 0. Exam significant for right beating nystagmus. Found to be in A fib on EKG, hypertensive with systolic BP in the 200's. Given 5mg IV lopressor with good effect. CTH negative for any acute intracranial pathology. CTA negative for any large vessel occlusion or high grade stenosis. CTP negative for perfusion deficit. Patient out of the window for TNK, not a candidate for mechanical thrombectomy. Of note, patient has not seen a primary care doctor since prior to the pandemic but has no know atrial fibrillation or cardiac issues. "  Remaining ROS negative       PHYSICAL EXAM:    General: sitting up in bed, pleasant and conversant  HEENT: NC/AT; rightward beating nystagmus (mild), lips are dry  Cardiovascular: +S1/S2, RRR, no mrg  Respiratory: CTA B/L; no W/R/R  Gastrointestinal: soft, NT/ND; +BSx4  Extremities: WWP; no edema or asymmetry  Neurological: mild rightward beating nystagmus, follows commands, speech is fluent  Psychiatric: pleasant mood and affect  Dermatologic: no appreciable wounds or damage to the skin    VITAL SIGNS:  Vital Signs Last 24 Hrs  T(C): 36.6 (2023 13:43), Max: 36.7 (2023 19:00)  T(F): 97.9 (2023 13:43), Max: 98.1 (2023 09:49)  HR: 81 (2023 12:20) (68 - 98)  BP: 163/68 (2023 12:20) (91/54 - 192/86)  BP(mean): 98 (2023 12:20) (68 - 124)  RR: 25 (2023 12:20) (13 - 30)  SpO2: 96% (2023 12:20) (94% - 98%)    Parameters below as of 2023 12:20  Patient On (Oxygen Delivery Method): room air          MEDICATIONS:  MEDICATIONS  (STANDING):  apixaban 2.5 milliGRAM(s) Oral every 12 hours  atorvastatin 80 milliGRAM(s) Oral at bedtime  meclizine 12.5 milliGRAM(s) Oral three times a day  polyethylene glycol 3350 17 Gram(s) Oral daily  senna 2 Tablet(s) Oral at bedtime  sodium chloride 0.9%. 1000 milliLiter(s) (75 mL/Hr) IV Continuous <Continuous>    MEDICATIONS  (PRN):  diazepam    Tablet 1 milliGRAM(s) Oral every 6 hours PRN if persistently dizzy even with standing meclizine  ondansetron Injectable 4 milliGRAM(s) IV Push every 6 hours PRN Nausea and/or Vomiting      ALLERGIES:  Allergies    penicillin (Anaphylaxis)    Intolerances        LABS:                        16.3   9.22  )-----------( 116      ( 2023 05:30 )             50.0     06-06    138  |  104  |  12  ----------------------------<  91  4.0   |  22  |  0.86    Ca    8.6      2023 05:30  Phos  3.7     06-06  Mg     2.1     06-06    TPro  8.0  /  Alb  4.7  /  TBili  0.9  /  DBili  x   /  AST  28  /  ALT  17  /  AlkPhos  178<H>  06-05    PT/INR - ( 2023 13:08 )   PT: see note sec;   INR: see note         PTT - ( 2023 13:08 )  PTT:see note sec  Urinalysis Basic - ( 2023 16:28 )    Color: Yellow / Appearance: Clear / S.025 / pH: x  Gluc: x / Ketone: 15 mg/dL  / Bili: Negative / Urobili: 0.2 E.U./dL   Blood: x / Protein: >=300 mg/dL / Nitrite: NEGATIVE   Leuk Esterase: NEGATIVE / RBC: 5-10 /HPF / WBC < 5 /HPF   Sq Epi: x / Non Sq Epi: x / Bacteria: None /HPF      CAPILLARY BLOOD GLUCOSE      POCT Blood Glucose.: 140 mg/dL (2023 12:54)      RADIOLOGY & ADDITIONAL TESTS: Reviewed.
  Patient is a 88y old  Female who presents with a chief complaint of dizziness with room spinning with new onset Afib (2023 08:24)      HPI:   **STROKE HPI***    HPI: 88y Female with PMHx of HTN (patient report this to be intermittent, not on any medications), maintains a vegan diet presents to the ED with vertigo with room spinning since 6/ 0000. The patient awoke at midnight last night and began to have room spinning and severe dizziness with nausea with any movement. She was afraid to move but called out to her  who couldn't hear her as he was sleeping in another room. She got little sleep but awoke this morning with persistent dizziness with nausea and room spinning with associated nausea and vomiting with any movement or changes in position but was asymptomatic at rest. Stroke code called on arrival to the ED. NIHSS 0. Exam significant for right beating nystagmus. Found to be in A fib on EKG, hypertensive with systolic BP in the 200's. Given 5mg IV lopressor with good effect. CTH negative for any acute intracranial pathology. CTA negative for any large vessel occlusion or high grade stenosis. CTP negative for perfusion deficit. Patient out of the window for TNK, not a candidate for mechanical thrombectomy. Of note, patient has not seen a primary care doctor since prior to the pandemic but has no know atrial fibrillation or cardiac issues.     PAST MEDICAL & SURGICAL HISTORY:      FAMILY HISTORY:      SOCIAL HISTORY:         T(C): 36.4 (23 @ 13:01), Max: 36.4 (23 @ 13:01)  HR: 75 (23 @ 15:06) (75 - 125)  BP: 171/77 (23 @ 15:06) (171/77 - 213/100)  RR: 18 (23 @ 15:06) (18 - 18)  SpO2: 97% (23 @ 15:06) (94% - 97%)    MEDICATION RECONCILIATION   MEDICATIONS  (STANDING):  apixaban 2.5 milliGRAM(s) Oral every 12 hours  atorvastatin 80 milliGRAM(s) Oral at bedtime  sodium chloride 0.9%. 1000 milliLiter(s) (75 mL/Hr) IV Continuous <Continuous>    MEDICATIONS  (PRN):  meclizine 12.5 milliGRAM(s) Oral every 8 hours PRN Dizziness  ondansetron Injectable 4 milliGRAM(s) IV Push every 6 hours PRN Nausea and/or Vomiting    Allergies    penicillin (Anaphylaxis)    Intolerances      Vital Signs Last 24 Hrs  T(C): 36.4 (2023 13:01), Max: 36.4 (2023 13:01)  T(F): 97.5 (2023 13:01), Max: 97.5 (2023 13:01)  HR: 75 (2023 15:06) (75 - 125)  BP: 171/77 (2023 15:06) (171/77 - 213/100)  BP(mean): --  RR: 18 (2023 15:06) (18 - 18)  SpO2: 97% (2023 15:06) (94% - 97%)    Parameters below as of 2023 15:06  Patient On (Oxygen Delivery Method): room air           (2023 17:02)    PAST MEDICAL & SURGICAL HISTORY:    MEDICATIONS  (STANDING):  apixaban 2.5 milliGRAM(s) Oral every 12 hours  atorvastatin 80 milliGRAM(s) Oral at bedtime    MEDICATIONS  (PRN):  meclizine 12.5 milliGRAM(s) Oral every 8 hours PRN Dizziness  ondansetron Injectable 4 milliGRAM(s) IV Push every 6 hours PRN Nausea and/or Vomiting             FAMILY HISTORY:      CBC Full  -  ( 2023 05:30 )  WBC Count : 9.22 K/uL  RBC Count : 5.22 M/uL  Hemoglobin : 16.3 g/dL  Hematocrit : 50.0 %  Platelet Count - Automated : 116 K/uL  Mean Cell Volume : 95.8 fl  Mean Cell Hemoglobin : 31.2 pg  Mean Cell Hemoglobin Concentration : 32.6 gm/dL  Auto Neutrophil # : x  Auto Lymphocyte # : x  Auto Monocyte # : x  Auto Eosinophil # : x  Auto Basophil # : x  Auto Neutrophil % : x  Auto Lymphocyte % : x  Auto Monocyte % : x  Auto Eosinophil % : x  Auto Basophil % : x          138  |  104  |  12  ----------------------------<  91  4.0   |  22  |  0.86    Ca    8.6      2023 05:30  Phos  3.7     06-  Mg     2.1     -06    TPro  8.0  /  Alb  4.7  /  TBili  0.9  /  DBili  x   /  AST  28  /  ALT  17  /  AlkPhos  178<H>  -      Urinalysis Basic - ( 2023 16:28 )    Color: Yellow / Appearance: Clear / S.025 / pH: x  Gluc: x / Ketone: 15 mg/dL  / Bili: Negative / Urobili: 0.2 E.U./dL   Blood: x / Protein: >=300 mg/dL / Nitrite: NEGATIVE   Leuk Esterase: NEGATIVE / RBC: 5-10 /HPF / WBC < 5 /HPF   Sq Epi: x / Non Sq Epi: x / Bacteria: None /HPF        Radiology :     < from: CT Brain Stroke Protocol (23 @ 13:13) >  ACC: [id number] EXAM:  CT BRAIN STROKE PROTOCOL   ORDERED BY: ENOCH ROMERO     PROCEDURE DATE:  2023          INTERPRETATION:  PROCEDURE: CT head without intravenous contrast    INDICATION: Dizziness    TECHNIQUE: Multiple axial images were obtained at 5 mm intervals from the   skull base to the vertex. Sagittal and coronal reformatted images were   obtained from the axial data set. The images were reviewed in brain and   bone windows.    COMPARISON: None    FINDINGS: The CT examination demonstrates the ventricles, cisternal   spaces, and cortical sulci to be within normal limits. There is no   midline shift or extra axial collections. The gray white differentiation   appears within normal limits. There is no intracranial hemorrhage or   acute transcortical infarct. There are mild patchy areas of hypodensity   within the periventricular white matter which is nonspecific and may   represent the sequela of small vessel ischemic disease in this patient.    The bony windows demonstrates no fractures. The visualized paranasal   sinuses are within normal limits. The mastoid air cells are well aerated.    The study was performed at 1:12 PM and the above findings were discussed   with Stroke PA Joanna Espinoza at 1:15 PM.    IMPRESSION: No intracranial hemorrhage or acute transcortical infarct.        < from: CT Perfusion w/ Maps w/ IV Cont (23 @ 13:28) >  ACC: [id number] EXAM:  CT PERFUSION W MAPS IC   ORDERED BY: ENOCH ROMERO     PROCEDURE DATE:  2023          INTERPRETATION:  PROCEDURE: CT Perfusion with intravenous contrast.    INDICATION: Dizziness    TECHNIQUE: Following the intravenous administration of 40 ml of   Isovue-370, serial axial images were obtained through the brain. The CT   perfusion data set was post processed per [facility] protocol   generating color maps of CBF, CBV, MTT, and Tmax.    COMPARISON: None    FINDINGS: The CT perfusion study demonstrates no perfusion abnormality.   There is no mismatch volume.    IMPRESSION: Normal CT perfusion study.      < from: CT Angio Head w/ IV Cont (23 @ 13:27) >  ACC: [id number] EXAM:  CT ANGIO BRAIN (W)AW IC   ORDERED BY: ENOCH ROMERO     ACC: [id number] EXAM:  CT ANGIO NECK STROKE PROTCL IC   ORDERED BY:   ENOCH ROMERO     PROCEDURE DATE:  2023          INTERPRETATION:  PROCEDURE: CTA brain with and without intravenous   contrast.    INDICATION: Dizziness    TECHNIQUE: Multiple thin section axial images were obtained through the   [place] of [place] following the intravenous injection of 80 ml of   Isovue-370. Multiple 3-D reformatted images were generated from the axial   cuts.    COMPARISON: None    FINDINGS: The CTA examination demonstrates the internal carotid arteries   to be normal in caliber. There is a normal bifurcation into the A1 and M1   segments. The left A1 segment is underdeveloped. The anterior   communicating artery is present. The A2 segments appear intact. There is   a normal MCA bifurcation. The right vertebral artery is underdeveloped   and ends in the posterior inferior cerebellar artery. The vertebral   arteries are normal in caliber. The basilar artery is normal in caliber.   There is a normal bifurcation into the posterior cerebral arteries. There   are no areas of stenosis, dilatation or aneurysm.    IMPRESSION: No large vessel occlusion.      PROCEDURE: CTA neck with and without intravenous contrast.    INDICATION: Dizziness    TECHNIQUE: Multiple axial thin section were obtained through the neck   following the intravenous injection of 80 ml of Isovue-370. Multiple 3-D   reformatted images weregenerated from the axial cuts.    COMPARISON: None    FINDINGS: The CTA examination demonstrates the right common carotid   artery to be normal in caliber. There is a normal bifurcation into the   right internal and external carotid arteries. There is no hemodynamically   significant stenosis.    The left common carotid artery is normal in caliber. There is a normal   bifurcation into the left internal and external carotid arteries. There   is minimum calcification within the proximal left internal carotid   artery. There is no hemodynamically significant stenosis.    The right vertebral artery is underdeveloped with the left being   dominant. The vertebral arteries are otherwise normal in caliber.    There is a common origin to the innominate and left common carotid   artery. The aortic arch appears intact without narrowing of the origin of   the great vessels.    IMPRESSION: Normal CTA of the neck.            REVIEW OF SYSTEMS:      CONSTITUTIONAL: No fever, weight loss, or fatigue  EYES: No eye pain, visual disturbances, or discharge  ENMT:  No difficulty hearing, tinnitus, vertigo; No sinus or throat pain  NECK: No pain or stiffness  BREASTS: No pain, masses, or nipple discharge  RESPIRATORY: No cough, wheezing, chills or hemoptysis; No shortness of breath  CARDIOVASCULAR: No chest pain, palpitations, dizziness, or leg swelling  GASTROINTESTINAL: No abdominal or epigastric pain. No nausea, vomiting, or hematemesis; No diarrhea or constipation. No melena or hematochezia.  GENITOURINARY: No dysuria, frequency, hematuria, or incontinence  NEUROLOGICAL:  per hpi   SKIN: No itching, burning, rashes, or lesions   LYMPH NODES: No enlarged glands  ENDOCRINE: No heat or cold intolerance; No hair loss  MUSCULOSKELETAL: No joint pain or swelling; No muscle, back, or extremity pain  PSYCHIATRIC: No depression, anxiety, mood swings, or difficulty sleeping  HEME/LYMPH: No easy bruising, or bleeding gums  ALLERGY AND IMMUNOLOGIC: No hives or eczema  VASCULAR: no swelling , erythema        Vital Signs Last 24 Hrs  T(C): 36.7 (2023 19:00), Max: 36.7 (2023 19:00)  T(F): 98 (2023 19:00), Max: 98 (2023 19:00)  HR: 68 (2023 06:21) (68 - 125)  BP: 135/60 (2023 06:21) (135/60 - 213/100)  BP(mean): 86 (2023 06:21) (86 - 124)  RR: 13 (2023 06:21) (13 - 22)  SpO2: 94% (2023 06:21) (94% - 98%)    Parameters below as of 2023 06:21  Patient On (Oxygen Delivery Method): room air            Physical Exam :   88 y o woman lying comfortably in semi Hutchins's position , awake , alert , no acute complaints     Head : normocephalic , atraumatic    Eyes : PERRLA , EOMI , no nystagmus , sclera anicteric    ENT / FACE : neg nasal discharge , uvula midline , no oropharyngeal erythema / exudate    Neck : supple , negative JVD , negative carotid bruits , no thyromegaly    Chest : CTA bilaterally , neg wheeze / rhonchi / rales / crackles / egophany    Cardiovascular : irregular rate and rhythm , neg murmurs / rubs / gallops    Abdomen : soft , non distended , non tender to palpation in all 4 quadrants ,  normal bowel sounds     Extremities : WWP , neg cyanosis /clubbing / edema     Neurologic Exam :    Alert and oriented to person , place , date/year , speech fluent w/o dysarthria , follows commands     Cranial Nerves:     II :                         pupils equal , round and reactive to light , visual fields intact   III/ IV/VI :              extraocular movements intact , neg nystagmus , neg ptosis  V :                        facial sensation intact , V1-3 normal  VII :                      face symmetric , no droop , normal eye closure and smile  VIII :                     hearing intact to finger rub bilaterally  IX and X :             no hoarseness , gag intact , palate/ uvula rise symmetrically  XI :                       SCM / trapezius strength intact bilateral  XII :                      no tongue deviation    Motor Exam:          Right UE:               no focal weakness ,  > 3+/5 throughout  , no drift     Left UE:                 no focal weakness ,  > 3+/5 throughout  , no drift         Right LE:    no focal weakness ,  > 3+/5 throughout      Left LE:    no focal weakness ,  > 3+/5 throughout         Sensation :         intact to light touch x 4 extremities                            no neglect or extinction on double simultaneous testing      DTR :     biceps/brachioradialis : equal                      patella/ankle : equal     neg Babinski       Coordination :      Finger to Nose :  neg dysmetria bilaterally       Gait :  not tested          PM&R Impression :   admitted for c/o dizziness     - no acute pathology on CT brain imaging, MRI pending        - no focal neurologic deficits         Recommendations / Plan :              1) Physical / Occupational therapy focusing on therapeutic exercises , equipment evaluation , bed mobility/transfer out of bed evaluation , progressive ambulation with assistive devices prn .    2) Current disposition plan recommendation  :    pending functional progress          
HPI    HPI: 88y Female with PMHx of HTN (no meds), ?dementia p/w vertigo. Full stroke w/u without evidence of stroke. The patient awoke at midnight had vertigo and nausea. Exam significant for right beating nystagmus. Found to be in A fib on EKG, hypertensive with systolic BP in the 200's. Given 5mg IV lopressor with good effect. CTH negative for any acute intracranial pathology. CTA negative for any large vessel occlusion or high grade stenosis.     Pt is quite forgetful during the exam - which is baseline per her family      SOCIAL HISTORY: non smoker  FAMILY HISTORY:    Reviewed and non contributory    ALLERGIES: 	  penicillin (Anaphylaxis)    MEDICATIONS:  apixaban 2.5 milliGRAM(s) Oral every 12 hours  atorvastatin 10 milliGRAM(s) Oral at bedtime  diazepam    Tablet 1 milliGRAM(s) Oral every 6 hours PRN  meclizine 12.5 milliGRAM(s) Oral three times a day  ondansetron Injectable 4 milliGRAM(s) IV Push every 6 hours PRN  polyethylene glycol 3350 17 Gram(s) Oral daily  senna 2 Tablet(s) Oral at bedtime      PHYSICAL EXAM:  T(C): 36.3 (06-07-23 @ 17:23), Max: 36.9 (06-07-23 @ 04:09)  HR: 100 (06-07-23 @ 20:53) (94 - 117)  BP: 141/64 (06-07-23 @ 20:53) (137/99 - 198/104)  RR: 25 (06-07-23 @ 20:53) (18 - 25)  SpO2: 97% (06-07-23 @ 20:53) (94% - 98%)  Wt(kg): --    GEN:  NAD.   HEENT: JVP 5  cmH2O  RESP: Chest clear bilaterally  CV: S1+s2. No murmur, irreg irreg  ABD: Soft and non tender  EXT: Warm and well perfused. No edema    LABS:                        17.3   13.54 )-----------( 128      ( 07 Jun 2023 05:30 )             51.8     06-07    142  |  109<H>  |  10  ----------------------------<  118<H>  4.0   |  23  |  0.67    Ca    9.2      07 Jun 2023 05:30  Phos  2.7     06-07  Mg     2.1     06-07        Radiographic Imaging, ECG, echocardiography personally reviewed.

## 2023-06-07 NOTE — DISCHARGE NOTE PROVIDER - NSDCCPCAREPLAN_GEN_ALL_CORE_FT
PRINCIPAL DISCHARGE DIAGNOSIS  Diagnosis: Vertigo  Assessment and Plan of Treatment: When you're dizzy, you may feel lightheaded, woozy, or disoriented. If you feel like you or the room are spinning, you have vertigo. These feelings may make you lose your balance.  Dizziness can have many different causes. A sudden drop in blood pressure or being dehydrated can make you dizzy. Many people feel lightheaded if they get up too quickly from sitting or lying down. Certain medicines and problems with your inner ear may cause dizziness. So can motion sickness. Sometimes dizziness can be a symptom of other disorders.        SECONDARY DISCHARGE DIAGNOSES  Diagnosis: Atrial fibrillation  Assessment and Plan of Treatment:

## 2023-06-07 NOTE — PROGRESS NOTE ADULT - ASSESSMENT
88y Female with PMHx of HTN (patient report this to be intermittent, not on any medications), maintains a vegan diet presents to the ED with vertigo with room spinning with exacerbation of symptoms with any movement and relief of symptoms with rest. Stroke code called on arrival to the ED. NIHSS 0. Exam significant for right beating nystagmus. Found to be in A fib on EKG, hypertensive with systolic BP in the 200's. Given 5mg IV lopressor with good effect. CTH negative for any acute intracranial pathology. CTA negative for any large vessel occlusion or high grade stenosis. CTP negative for perfusion deficit. Patient out of the window for TNK, not a candidate for mechanical thrombectomy. Although suspicion for stroke is low given relief of symptoms at rest, given new onset Afib, admitted to stroke service for further workup.     Neuro  #CVA workup  - continue Eliquis 2.5mg BID  - continue atorvastatin 80mg daily  - q4hr stroke neuro checks and vitals  - MRI brain negative for acute ischemia  - Stroke Code HCT Results: negative  - Stroke Code CTA Results: negative  - Stroke education    #Vertigo  - Meclizine 12.5mg TID standing  - Valium 1mg q6h PRN if persistently dizzy even with standing meclizine  - Zofran 4mg q6hr PRN    Cards  #HTN  - sBP goal 120-200  - patient not on any hypertensive meds at home  - obtain TTE   - Stroke Code EKG Results: Atrial fibrillation w/ RVR     #New onset Afib  - Received one dose of IV lopressor 5mg in ED for RVR  -c/w Eliquis 2.5mg BID  - Consider cardiology consult if patient goes into RVR      #HLD  - high dose statin as above in CVA  - LDL results: 89    Pulm  - call provider if SPO2 < 94%    GI  #Nutrition/Fluids/Electrolytes   - replete K<4 and Mg <2  - Diet: Vegan  - IVF: NS @ 75cc/hr    Renal  - Trend BMP    Infectious Disease  - Stroke Code CXR results: No evidence of acute cardiopulmonary disease    Endocrine  - A1C results: 5.8  - TSH results: 1.740    Heme  #elevated RBC, hgb, hct  - f/u RONEY protein     DVT Prophylaxis  - c/w Eliquis   - SCDs for DVT prophylaxis       IDR Goals: Goals reviewed at interdisciplinary rounds with case management, social work, physical therapy, occupational therapy, and speech language pathology.   Please see specific therapy  notes for in depth goals.    Dispo: pending PT/OT     Discussed daily hospital plans and goals with patient at bedside.     Discussed with Neurology Attending Dr. Katerin Monet   88y Female with PMHx of HTN (patient report this to be intermittent, not on any medications), maintains a vegan diet presents to the ED with vertigo with room spinning with exacerbation of symptoms with any movement and relief of symptoms with rest. Stroke code called on arrival to the ED. NIHSS 0. Exam significant for right beating nystagmus. Found to be in A fib on EKG, hypertensive with systolic BP in the 200's. Given 5mg IV lopressor with good effect. CTH negative for any acute intracranial pathology. CTA negative for any large vessel occlusion or high grade stenosis. CTP negative for perfusion deficit. Patient out of the window for TNK, not a candidate for mechanical thrombectomy. Although suspicion for stroke is low given relief of symptoms at rest, given new onset Afib, admitted to stroke service for further workup.     Neuro  #CVA workup  - continue Eliquis 2.5mg BID  - continue atorvastatin 10mg daily  - q4hr stroke neuro checks and vitals  - MRI brain negative for acute ischemia  - Stroke Code HCT Results: negative  - Stroke Code CTA Results: negative    #Vertigo  - Meclizine 12.5mg TID standing  - Valium 1mg q6h PRN if persistently dizzy even with standing meclizine  - Zofran 4mg q6hr PRN    Cards  #HTN  #New onset Afib  #orthostatic hypotension  - sBP goal normotension  - patient not on any hypertensive meds at home  - obtain TTE   - Stroke Code EKG Results: Atrial fibrillation w/ RVR   - c/w Eliquis 2.5mg BID  - cards consult placed for new onset afib and orthostatic hypotension  - hold anti-hypertensives for now given hypotension      #HLD  - high dose statin as above in CVA  - LDL results: 89    Pulm  - call provider if SPO2 < 94%    GI  #Nutrition/Fluids/Electrolytes   - replete K<4 and Mg <2  - Diet: Vegan  - IVF: NS @ 75cc/hr    Renal  - Trend BMP    Infectious Disease  - Stroke Code CXR results: No evidence of acute cardiopulmonary disease    Endocrine  - A1C results: 5.8  - TSH results: 1.740    Heme  #polycythemia vera  - f/u RONEY protein   - LFTs outpatient     DVT Prophylaxis  - c/w Eliquis   - SCDs for DVT prophylaxis       IDR Goals: Goals reviewed at interdisciplinary rounds with case management, social work, physical therapy, occupational therapy, and speech language pathology.   Please see specific therapy  notes for in depth goals.    Dispo: pending PT/OT     Discussed daily hospital plans and goals with patient and  and daughter at bedside.     Discussed with Neurology Attending Dr. London   88y Female with PMHx of HTN (patient report this to be intermittent, not on any medications), maintains a vegan diet presents to the ED with vertigo with room spinning with exacerbation of symptoms with any movement and relief of symptoms with rest. Stroke code called on arrival to the ED. NIHSS 0. Exam significant for right beating nystagmus. Found to be in A fib on EKG, hypertensive with systolic BP in the 200's. Given 5mg IV lopressor with good effect. CTH negative for any acute intracranial pathology. CTA negative for any large vessel occlusion or high grade stenosis. CTP negative for perfusion deficit. Patient out of the window for TNK, not a candidate for mechanical thrombectomy. Although suspicion for stroke is low given relief of symptoms at rest, given new onset Afib, admitted to stroke service for further workup.     Neuro  #CVA workup  - continue Eliquis 2.5mg BID  - continue atorvastatin 10mg daily  - q4hr stroke neuro checks and vitals  - MRI brain negative for acute ischemia  - Stroke Code HCT Results: negative  - Stroke Code CTA Results: negative    #Vertigo  - Meclizine 12.5mg TID standing  - Valium 1mg q6h PRN if persistently dizzy even with standing meclizine  - Zofran 4mg q6hr PRN    Cards  #HTN  #New onset Afib  #orthostatic hypotension  - sBP goal gradual normotension, notify if sBP >180 or <120  - patient not on any hypertensive meds at home  - obtain TTE   - Stroke Code EKG Results: Atrial fibrillation w/ RVR   - c/w Eliquis 2.5mg BID  - cards consult placed for new onset afib and orthostatic hypotension  - hold anti-hypertensives for now given hypotension      #HLD  - high dose statin as above in CVA  - LDL results: 89    Pulm  - call provider if SPO2 < 94%    GI  #Nutrition/Fluids/Electrolytes   - replete K<4 and Mg <2  - Diet: Vegan  - IVF: NS @ 75cc/hr    Renal  - Trend BMP    Infectious Disease  - Stroke Code CXR results: No evidence of acute cardiopulmonary disease    Endocrine  - A1C results: 5.8  - TSH results: 1.740    Heme  #polycythemia vera  - f/u RONEY protein   - LFTs outpatient     DVT Prophylaxis  - c/w Eliquis   - SCDs for DVT prophylaxis       IDR Goals: Goals reviewed at interdisciplinary rounds with case management, social work, physical therapy, occupational therapy, and speech language pathology.   Please see specific therapy  notes for in depth goals.    Dispo: pending PT/OT     Discussed daily hospital plans and goals with patient and  and daughter at bedside.     Discussed with Neurology Attending Dr. London   88y Female with PMHx of HTN (patient report this to be intermittent, not on any medications), maintains a vegan diet presents to the ED with vertigo with room spinning with exacerbation of symptoms with any movement and relief of symptoms with rest. Stroke code called on arrival to the ED. NIHSS 0. Exam significant for right beating nystagmus. Found to be in A fib on EKG, hypertensive with systolic BP in the 200's. Given 5mg IV lopressor with good effect. CTH negative for any acute intracranial pathology. CTA negative for any large vessel occlusion or high grade stenosis. CTP negative for perfusion deficit. Patient out of the window for TNK, not a candidate for mechanical thrombectomy. Although suspicion for stroke is low given relief of symptoms at rest, given new onset Afib, admitted to stroke service for further workup.     Neuro  #CVA workup  - continue Eliquis 2.5mg BID  - continue atorvastatin 10mg daily  - q4hr stroke neuro checks and vitals  - MRI brain negative for acute ischemia  - Stroke Code HCT Results: negative  - Stroke Code CTA Results: negative    #Vertigo  - Meclizine 12.5mg TID standing  - Valium 1mg q6h PRN if persistently dizzy even with standing meclizine  - Zofran 4mg q6hr PRN    Cards  #HTN  #New onset Afib  #orthostatic hypotension  - sBP goal gradual normotension, notify if sBP >180 or <100  - patient not on any hypertensive meds at home  - obtain TTE   - Stroke Code EKG Results: Atrial fibrillation w/ RVR   - c/w Eliquis 2.5mg BID  - cards consult placed for new onset afib and orthostatic hypotension  - hold anti-hypertensives for now given hypotension      #HLD  - high dose statin as above in CVA  - LDL results: 89    Pulm  - call provider if SPO2 < 94%    GI  #Nutrition/Fluids/Electrolytes   - replete K<4 and Mg <2  - Diet: Vegan  - IVF: NS @ 75cc/hr    Renal  - Trend BMP    Infectious Disease  - Stroke Code CXR results: No evidence of acute cardiopulmonary disease    Endocrine  - A1C results: 5.8  - TSH results: 1.740    Heme  #polycythemia vera  - f/u RONEY protein   - LFTs outpatient     DVT Prophylaxis  - c/w Eliquis   - SCDs for DVT prophylaxis       IDR Goals: Goals reviewed at interdisciplinary rounds with case management, social work, physical therapy, occupational therapy, and speech language pathology.   Please see specific therapy  notes for in depth goals.    Dispo: pending PT/OT     Discussed daily hospital plans and goals with patient and  and daughter at bedside.     Discussed with Neurology Attending Dr. London

## 2023-06-07 NOTE — PROGRESS NOTE ADULT - ASSESSMENT
88F with PMH of HTN presenting with vertigo/dizziness and hypertensive, but not within TNK window.  Also noted to be in new atrial fibrillation.  started on anticoagulation and admitted to the stroke service for further workup and medicine consultation.     #Vertigo  #CVA workup  #HTN  #New atrial fibrillation  - started on meclizine standing  - prn valium should meclizine not work  - pending TTE and MRI  - monitor on telemetry  - check lipid panel, TSH and HbA1C  - is significantly orthostatic, likely from deconditioning.  Recommend sitting up in chair, and compression stockings.  Also, can liberalize fluid intake, as long as EF is normal on echocardiogram.      #Polycythemia  #Thrombocytopenia  - ?increased viscosity as cause of symptoms  - still with polycythemia - now with leukocytosis  - no signs or symptoms of infection    PT/OT consult  Eliquis for anticoagulation  Dispo pending clinical improvement  Bowel regimen    Plan of care discussed with stroke team.    >35 minutes spent on this encounter, including face to face with patient, care coordination and documentation.

## 2023-06-07 NOTE — CONSULT NOTE ADULT - REASON FOR ADMISSION
dizziness with roomspinning with new onset Afib
dizziness with roomspinning with new onset Afib
dizziness with room spinning with new onset Afib

## 2023-06-07 NOTE — DISCHARGE NOTE PROVIDER - NSDCFUADDAPPT_GEN_ALL_CORE_FT
Continue to take eliquis 2.5mg twice a day and atorvastatin 10mg for cholesterol as well as your BP meds.    Follow up with your PCP within 2 weeks of discharge.    Have your PCP make a referral for vestibular therapy for your vertigo. Continue to take eliquis 2.5mg twice a day and atorvastatin 10mg for cholesterol as well as your BP meds.    Follow up with your PCP within 2 weeks of discharge.    Have your PCP make a referral for vestibular therapy for your vertigo.    If you do not have a PCP please call [phone] to make appointment with one

## 2023-06-07 NOTE — PROGRESS NOTE ADULT - NS ATTEND AMEND GEN_ALL_CORE FT
The patient is an 88-year-old female with a history of hypertension admitted after resenting with vertigo (5 peripheral nature) but due to new onset A-fib, admitted for stroke eval.  CT head, CTA, CTP unremarkable.  MRI brain negative for acute ischemia. TTE pending. Continue Eliquis 2.5 mg twice daily.  Continue statin (LDL 89, will reduce dose to 10 mg daily due to small amounts of plaque if neck vessels/aorta).  Course complicated by orthostatic hypotension and supine hypertension (unclear chronicity)- will discuss with cardiology.  For now, SBP<180 while lying down    Will need outpatient eval for hematologic abnormalities.

## 2023-06-07 NOTE — DISCHARGE NOTE PROVIDER - HOSPITAL COURSE
88y Female with PMH  of HTN (patient report this to be intermittent, not on any medications), maintains a vegan diet presents to the ED with vertigo with room spinning with exacerbation of symptoms with any movement and relief of symptoms with rest. Stroke code called on arrival to the ED. NIHSS 0. Exam significant for right beating nystagmus. Found to be in A fib on EKG, hypertensive with systolic BP in the 200's. CTH negative for any acute intracranial pathology. CTA negative for any large vessel occlusion or high grade stenosis. CTP negative for perfusion deficit. Patient out of the window for TNK, not a candidate for mechanical thrombectomy. Although suspicion for stroke is low given relief of symptoms at rest, given new onset Afib, admitted to stroke service for further workup.       During this hospital course, patient had a (ischemic/hemorrhagic) stroke located in (left/right.....) as seen on (MRI/CT).   The stroke etiology is likely secondary to:  []atrial fibrillation  []small vessel disease from atherosclerotic risk factors  []other:  []etiology workup still in progress    Patient had the following workup done in house:  CT Head:   MR Head Non Contrast:  CT Angio Head:  CT Angio Neck:  Echo  Labs  other    Physical exam at discharge:  NIHSS at discharge:     New medications on discharge:  Labs to be followed up:  Imaging to be done as outpatient:  Further outpatient workup:   88y Female with PMH of HTN (patient report this to be intermittent, not on any medications), maintains a vegan diet presents to the ED with vertigo with room spinning with exacerbation of symptoms with any movement and relief of symptoms with rest. Stroke code called on arrival to the ED. NIHSS 0. Exam significant for right beating nystagmus. Found to be in A fib on EKG, hypertensive with systolic BP in the 200's. CTH negative for any acute intracranial pathology. CTA negative for any large vessel occlusion or high grade stenosis. CTP negative for perfusion deficit. Patient out of the window for TNK, not a candidate for mechanical thrombectomy. Although suspicion for stroke is low given relief of symptoms at rest, given new onset Afib, admitted to stroke service for further workup. MRI negative for acute ischemia. Started on eliquis 2.5mg BID. Cards consult placed for new onset afib and orthostatic hypotension. Likely cause of orthostasis was PRN pushes. Started on amlodipine and coreg with BP in better control. TTE *** PT/OT ***      During this hospital course, patient had vertigo. Did not have a stroke.       Patient had the following workup done in house:  CT Head: neg  MR Head Non Contrast: neg  CT Angio Head/Neck: no LVO or HGS  Echo***  Labs  a1c: 5.8  TSH: 1.740  LDL: 89      Physical exam at discharge:  General: No acute distress, awake and alert  Cardiovascular: Regular rate and rhythm, no murmurs, rubs, or gallops. No bruits  Pulmonary: Anterior breath sounds clear bilaterally, no crackles or wheezing. No use of accessory muscles  GI: Abdomen soft, non-tender, non-distended    Neurologic:  -Mental status: Awake, alert, oriented to person, place, and time. Speech is fluent with intact naming, repetition, and comprehension, no dysarthria. Recent and remote memory intact. Follows commands. Attention/concentration intact.   -Cranial nerves:   II: Visual fields are full to confrontation.  III, IV, VI: Extraocular movements are intact persistent right beating nystagmus noted. Pupils equally round and reactive to light  V:  Facial sensation V1-V3 equal and intact   VII: symmetric  Motor: Normal bulk and tone. No pronator drift. Strength bilateral upper extremity 5/5, bilateral lower extremities 5/5.  Rapid alternating movements intact and symmetric  Sensation: Intact to light touch bilaterally. No neglect or extinction on double simultaneous testing.  Coordination: No dysmetria on finger-to-nose bilaterally  Gait: Deferred    NIHSS at discharge: 0    New medications on discharge: eliquis 2.5mg BID, atorvastatin 10mg, coreg 3.125mg q12h, amlodipine 5mg  Labs to be followed up: RONEY protein, LFTs for possible polycythemia vera  Further outpatient workup: vestibular therapy   88y Female with PMH of HTN (patient report this to be intermittent, not on any medications), maintains a vegan diet presents to the ED with vertigo with room spinning with exacerbation of symptoms with any movement and relief of symptoms with rest. Stroke code called on arrival to the ED. NIHSS 0. Exam significant for right beating nystagmus. Found to be in A fib on EKG, hypertensive with systolic BP in the 200's. CTH negative for any acute intracranial pathology. CTA negative for any large vessel occlusion or high grade stenosis. CTP negative for perfusion deficit. Patient out of the window for TNK, not a candidate for mechanical thrombectomy. Although suspicion for stroke is low given relief of symptoms at rest, given new onset Afib, admitted to stroke service for further workup. MRI negative for acute ischemia. Started on eliquis 2.5mg BID. Cards consult placed for new onset afib and orthostatic hypotension. Likely cause of orthostasis was PRN pushes. Started on amlodipine and coreg with BP in better control. TTE *** PT/OT ***      During this hospital course, patient had vertigo. Did not have a stroke.       Patient had the following workup done in house:  CT Head: neg  MR Head Non Contrast: neg  CT Angio Head/Neck: no LVO or HGS  Echo: normal LVEF, mild LVF, mild-mod AS, mild AI, mild- mod MR/TR, small pericardial effusion     Labs  a1c: 5.8  TSH: 1.740  LDL: 89      Physical exam at discharge:  General: No acute distress, awake and alert  Cardiovascular: Regular rate and rhythm, no murmurs, rubs, or gallops. No bruits  Pulmonary: Anterior breath sounds clear bilaterally, no crackles or wheezing. No use of accessory muscles  GI: Abdomen soft, non-tender, non-distended    Neurologic:  -Mental status: Awake, alert, oriented to person, place, and time. Speech is fluent with intact naming, repetition, and comprehension, no dysarthria. Recent and remote memory intact. Follows commands. Attention/concentration intact.   -Cranial nerves:   II: Visual fields are full to confrontation.  III, IV, VI: Extraocular movements are intact persistent right beating nystagmus noted. Pupils equally round and reactive to light  V:  Facial sensation V1-V3 equal and intact   VII: symmetric  Motor: Normal bulk and tone. No pronator drift. Strength bilateral upper extremity 5/5, bilateral lower extremities 5/5.  Rapid alternating movements intact and symmetric  Sensation: Intact to light touch bilaterally. No neglect or extinction on double simultaneous testing.  Coordination: No dysmetria on finger-to-nose bilaterally  Gait: Deferred    NIHSS at discharge: 0    New medications on discharge: eliquis 2.5mg BID, atorvastatin 10mg  Labs to be followed up: RONEY protein, LFTs for possible polycythemia vera  Further outpatient workup: vestibular therapy

## 2023-06-07 NOTE — DISCHARGE NOTE PROVIDER - CARE PROVIDER_API CALL
Katerin Monet  Neurology  [address] [address]  Phone: [phone]  Fax: [phone]  Follow Up Time: 2 weeks

## 2023-06-07 NOTE — PROVIDER CONTACT NOTE (CHANGE IN STATUS NOTIFICATION) - ASSESSMENT
Returned from MRI, anxious about ambulation progress and frequency of urination. No new neurological changes

## 2023-06-07 NOTE — DISCHARGE NOTE PROVIDER - NSDCMRMEDTOKEN_GEN_ALL_CORE_FT
apixaban 2.5 mg oral tablet: 1 tab(s) orally every 12 hours  atorvastatin 10 mg oral tablet: 1 tab(s) orally once a day (at bedtime)  meclizine 12.5 mg oral tablet: 1 tab(s) orally every 8 hours as needed for Dizziness

## 2023-06-08 LAB
ANION GAP SERPL CALC-SCNC: 12 MMOL/L — SIGNIFICANT CHANGE UP (ref 5–17)
BASOPHILS # BLD AUTO: 0 K/UL — SIGNIFICANT CHANGE UP (ref 0–0.2)
BASOPHILS NFR BLD AUTO: 0 % — SIGNIFICANT CHANGE UP (ref 0–2)
BUN SERPL-MCNC: 12 MG/DL — SIGNIFICANT CHANGE UP (ref 7–23)
BURR CELLS BLD QL SMEAR: PRESENT — SIGNIFICANT CHANGE UP
CALCIUM SERPL-MCNC: 9 MG/DL — SIGNIFICANT CHANGE UP (ref 8.4–10.5)
CHLORIDE SERPL-SCNC: 104 MMOL/L — SIGNIFICANT CHANGE UP (ref 96–108)
CO2 SERPL-SCNC: 22 MMOL/L — SIGNIFICANT CHANGE UP (ref 22–31)
CREAT SERPL-MCNC: 0.82 MG/DL — SIGNIFICANT CHANGE UP (ref 0.5–1.3)
EGFR: 70 ML/MIN/1.73M2 — SIGNIFICANT CHANGE UP
EOSINOPHIL # BLD AUTO: 0 K/UL — SIGNIFICANT CHANGE UP (ref 0–0.5)
EOSINOPHIL NFR BLD AUTO: 0 % — SIGNIFICANT CHANGE UP (ref 0–6)
GIANT PLATELETS BLD QL SMEAR: PRESENT — SIGNIFICANT CHANGE UP
GLUCOSE SERPL-MCNC: 132 MG/DL — HIGH (ref 70–99)
HCT VFR BLD CALC: 48.2 % — HIGH (ref 34.5–45)
HGB BLD-MCNC: 15.8 G/DL — HIGH (ref 11.5–15.5)
LYMPHOCYTES # BLD AUTO: 2.25 K/UL — SIGNIFICANT CHANGE UP (ref 1–3.3)
LYMPHOCYTES # BLD AUTO: 22.2 % — SIGNIFICANT CHANGE UP (ref 13–44)
MAGNESIUM SERPL-MCNC: 1.9 MG/DL — SIGNIFICANT CHANGE UP (ref 1.6–2.6)
MANUAL SMEAR VERIFICATION: SIGNIFICANT CHANGE UP
MCHC RBC-ENTMCNC: 31.7 PG — SIGNIFICANT CHANGE UP (ref 27–34)
MCHC RBC-ENTMCNC: 32.8 GM/DL — SIGNIFICANT CHANGE UP (ref 32–36)
MCV RBC AUTO: 96.8 FL — SIGNIFICANT CHANGE UP (ref 80–100)
MONOCYTES # BLD AUTO: 1.47 K/UL — HIGH (ref 0–0.9)
MONOCYTES NFR BLD AUTO: 14.5 % — HIGH (ref 2–14)
NEUTROPHILS # BLD AUTO: 6.42 K/UL — SIGNIFICANT CHANGE UP (ref 1.8–7.4)
NEUTROPHILS NFR BLD AUTO: 63.3 % — SIGNIFICANT CHANGE UP (ref 43–77)
OVALOCYTES BLD QL SMEAR: SLIGHT — SIGNIFICANT CHANGE UP
PHOSPHATE SERPL-MCNC: 3.3 MG/DL — SIGNIFICANT CHANGE UP (ref 2.5–4.5)
PLAT MORPH BLD: ABNORMAL
PLATELET # BLD AUTO: 115 K/UL — LOW (ref 150–400)
POIKILOCYTOSIS BLD QL AUTO: SLIGHT — SIGNIFICANT CHANGE UP
POLYCHROMASIA BLD QL SMEAR: SLIGHT — SIGNIFICANT CHANGE UP
POTASSIUM SERPL-MCNC: 3.9 MMOL/L — SIGNIFICANT CHANGE UP (ref 3.5–5.3)
POTASSIUM SERPL-SCNC: 3.9 MMOL/L — SIGNIFICANT CHANGE UP (ref 3.5–5.3)
RBC # BLD: 4.98 M/UL — SIGNIFICANT CHANGE UP (ref 3.8–5.2)
RBC # FLD: 13.6 % — SIGNIFICANT CHANGE UP (ref 10.3–14.5)
RBC BLD AUTO: ABNORMAL
SODIUM SERPL-SCNC: 138 MMOL/L — SIGNIFICANT CHANGE UP (ref 135–145)
WBC # BLD: 10.14 K/UL — SIGNIFICANT CHANGE UP (ref 3.8–10.5)
WBC # FLD AUTO: 10.14 K/UL — SIGNIFICANT CHANGE UP (ref 3.8–10.5)

## 2023-06-08 PROCEDURE — 99232 SBSQ HOSP IP/OBS MODERATE 35: CPT

## 2023-06-08 PROCEDURE — 99221 1ST HOSP IP/OBS SF/LOW 40: CPT

## 2023-06-08 RX ORDER — POTASSIUM CHLORIDE 20 MEQ
20 PACKET (EA) ORAL ONCE
Refills: 0 | Status: COMPLETED | OUTPATIENT
Start: 2023-06-08 | End: 2023-06-08

## 2023-06-08 RX ORDER — CARVEDILOL PHOSPHATE 80 MG/1
3.12 CAPSULE, EXTENDED RELEASE ORAL EVERY 12 HOURS
Refills: 0 | Status: DISCONTINUED | OUTPATIENT
Start: 2023-06-08 | End: 2023-06-09

## 2023-06-08 RX ORDER — MAGNESIUM OXIDE 400 MG ORAL TABLET 241.3 MG
400 TABLET ORAL ONCE
Refills: 0 | Status: COMPLETED | OUTPATIENT
Start: 2023-06-08 | End: 2023-06-08

## 2023-06-08 RX ADMIN — MAGNESIUM OXIDE 400 MG ORAL TABLET 400 MILLIGRAM(S): 241.3 TABLET ORAL at 12:48

## 2023-06-08 RX ADMIN — Medication 12.5 MILLIGRAM(S): at 21:34

## 2023-06-08 RX ADMIN — CARVEDILOL PHOSPHATE 6.25 MILLIGRAM(S): 80 CAPSULE, EXTENDED RELEASE ORAL at 05:42

## 2023-06-08 RX ADMIN — Medication 20 MILLIEQUIVALENT(S): at 12:48

## 2023-06-08 RX ADMIN — Medication 12.5 MILLIGRAM(S): at 05:42

## 2023-06-08 RX ADMIN — AMLODIPINE BESYLATE 5 MILLIGRAM(S): 2.5 TABLET ORAL at 05:42

## 2023-06-08 RX ADMIN — APIXABAN 2.5 MILLIGRAM(S): 2.5 TABLET, FILM COATED ORAL at 05:42

## 2023-06-08 RX ADMIN — CARVEDILOL PHOSPHATE 3.12 MILLIGRAM(S): 80 CAPSULE, EXTENDED RELEASE ORAL at 18:53

## 2023-06-08 RX ADMIN — Medication 12.5 MILLIGRAM(S): at 15:43

## 2023-06-08 RX ADMIN — ATORVASTATIN CALCIUM 10 MILLIGRAM(S): 80 TABLET, FILM COATED ORAL at 21:34

## 2023-06-08 RX ADMIN — APIXABAN 2.5 MILLIGRAM(S): 2.5 TABLET, FILM COATED ORAL at 18:53

## 2023-06-08 NOTE — PROGRESS NOTE ADULT - SUBJECTIVE AND OBJECTIVE BOX
Physical Medicine and Rehabilitation Progress Note :       Patient is a 88y old  Female who presents with a chief complaint of dizziness with roomspinning with new onset Afib (08 Jun 2023 12:38)      HPI:   **STROKE HPI***    HPI: 88y Female with PMHx of HTN (patient report this to be intermittent, not on any medications), maintains a vegan diet presents to the ED with vertigo with room spinning since 6/5 0000. The patient awoke at midnight last night and began to have room spinning and severe dizziness with nausea with any movement. She was afraid to move but called out to her  who couldn't hear her as he was sleeping in another room. She got little sleep but awoke this morning with persistent dizziness with nausea and room spinning with associated nausea and vomiting with any movement or changes in position but was asymptomatic at rest. Stroke code called on arrival to the ED. NIHSS 0. Exam significant for right beating nystagmus. Found to be in A fib on EKG, hypertensive with systolic BP in the 200's. Given 5mg IV lopressor with good effect. CTH negative for any acute intracranial pathology. CTA negative for any large vessel occlusion or high grade stenosis. CTP negative for perfusion deficit. Patient out of the window for TNK, not a candidate for mechanical thrombectomy. Of note, patient has not seen a primary care doctor since prior to the pandemic but has no know atrial fibrillation or cardiac issues.     PAST MEDICAL & SURGICAL HISTORY:      FAMILY HISTORY:      SOCIAL HISTORY:         T(C): 36.4 (06-05-23 @ 13:01), Max: 36.4 (06-05-23 @ 13:01)  HR: 75 (06-05-23 @ 15:06) (75 - 125)  BP: 171/77 (06-05-23 @ 15:06) (171/77 - 213/100)  RR: 18 (06-05-23 @ 15:06) (18 - 18)  SpO2: 97% (06-05-23 @ 15:06) (94% - 97%)    MEDICATION RECONCILIATION   MEDICATIONS  (STANDING):  apixaban 2.5 milliGRAM(s) Oral every 12 hours  atorvastatin 80 milliGRAM(s) Oral at bedtime  sodium chloride 0.9%. 1000 milliLiter(s) (75 mL/Hr) IV Continuous <Continuous>    MEDICATIONS  (PRN):  meclizine 12.5 milliGRAM(s) Oral every 8 hours PRN Dizziness  ondansetron Injectable 4 milliGRAM(s) IV Push every 6 hours PRN Nausea and/or Vomiting    Allergies    penicillin (Anaphylaxis)    Intolerances      Vital Signs Last 24 Hrs  T(C): 36.4 (05 Jun 2023 13:01), Max: 36.4 (05 Jun 2023 13:01)  T(F): 97.5 (05 Jun 2023 13:01), Max: 97.5 (05 Jun 2023 13:01)  HR: 75 (05 Jun 2023 15:06) (75 - 125)  BP: 171/77 (05 Jun 2023 15:06) (171/77 - 213/100)  BP(mean): --  RR: 18 (05 Jun 2023 15:06) (18 - 18)  SpO2: 97% (05 Jun 2023 15:06) (94% - 97%)    Parameters below as of 05 Jun 2023 15:06  Patient On (Oxygen Delivery Method): room air           (05 Jun 2023 17:02)                            15.8   10.14 )-----------( 115      ( 08 Jun 2023 05:30 )             48.2       06-08    138  |  104  |  12  ----------------------------<  132<H>  3.9   |  22  |  0.82    Ca    9.0      08 Jun 2023 05:30  Phos  3.3     06-08  Mg     1.9     06-08      Vital Signs Last 24 Hrs  T(C): 37.1 (08 Jun 2023 13:54), Max: 37.1 (08 Jun 2023 13:54)  T(F): 98.7 (08 Jun 2023 13:54), Max: 98.7 (08 Jun 2023 13:54)  HR: 84 (08 Jun 2023 10:57) (80 - 105)  BP: 137/60 (08 Jun 2023 10:57) (92/44 - 189/88)  BP(mean): 87 (08 Jun 2023 10:57) (64 - 127)  RR: 19 (08 Jun 2023 10:57) (15 - 25)  SpO2: 97% (08 Jun 2023 10:57) (94% - 98%)    Parameters below as of 08 Jun 2023 10:57  Patient On (Oxygen Delivery Method): room air        MEDICATIONS  (STANDING):  amLODIPine   Tablet 5 milliGRAM(s) Oral daily  apixaban 2.5 milliGRAM(s) Oral every 12 hours  atorvastatin 10 milliGRAM(s) Oral at bedtime  carvedilol 3.125 milliGRAM(s) Oral every 12 hours  meclizine 12.5 milliGRAM(s) Oral three times a day  polyethylene glycol 3350 17 Gram(s) Oral daily  senna 2 Tablet(s) Oral at bedtime    MEDICATIONS  (PRN):       6/7/2023 Functional Status Assessment :       Pain Assessment/Number Scale (0-10) Adult  Presence of Pain: denies pain/discomfort (Rating = 0)  Pain Rating (0-10): Rest: 0 (no pain/absence of nonverbal indicators of pain)  Pain Rating (0-10): Activity: 0 (no pain/absence of nonverbal indicators of pain)    Safety      AM-PAC Functional Assessment: Basic Mobility  Type of Assessment: Daily assessment  Turning from your back to your side while in a flat bed without using bedrails?: 3 = A little assistance  Moving from lying on your back to sitting on the flat side of a flat bed without using bedrails?: 3 = A little assistance  Moving to and from a bed to a chair (including a wheelchair)?: 3 = A little assistance  Standing up from a chair using your arms (e.g. wheelchair or bedside chair)?: 3 = A little assistance  Walking in hospital room?: 3 = A little assistance  Climbing 3-5 steps with a railing?: 3-calculated by average   Score: 18   Row Comment: Ask the patient "How much help from another person do you currently need? (If the patient hasn't done an activity recently, how much help from another person do you think he/she needs if he/she tried?)    Cognitive/Neuro      Cognitive/Neuro/Behavioral  Cognitive/Neuro/Behavioral [WDL Definition: Alert; opens eyes spontaneously; arouses to voice or touch; oriented x 4; follows commands; speech spontaneous, logical; purposeful motor response; behavior appropriate to situation]: WDL  Level of Consciousness: forgetful    Language Assistance  Preferred Language to Address Healthcare Preferred Language to Address Healthcare: English    Therapeutic Interventions      Bed Mobility  Bed Mobility Training Sit-to-Supine: contact guard;  verbal cues;  1 person assist  Bed Mobility Training Supine-to-Sit: contact guard;  verbal cues;  1 person assist    Sit-Stand Transfer Training  Transfer Training Sit-to-Stand Transfer: contact guard;  verbal cues;  1 person assist;  full weight-bearing   rolling walker  Transfer Training Stand-to-Sit Transfer: contact guard;  verbal cues;  1 person assist;  full weight-bearing   rolling walker    Gait Training  Gait Training: contact guard;  verbal cues;  1 person assist;  full weight-bearing   rolling walker;  10 feet  Gait Analysis: 2-point gait   decreased diana;  10 feet  Gait Number of Times:: x 1            PM&R Impression : as above    Current Disposition Plan Recommendations :  pending functional progress

## 2023-06-08 NOTE — PROGRESS NOTE ADULT - ASSESSMENT
88F with PMH of HTN presenting with vertigo/dizziness and hypertensive, but not within TNK window.  Also noted to be in new atrial fibrillation.  started on anticoagulation and admitted to the stroke service for further workup and medicine consultation.     #Vertigo  #CVA workup  #HTN  #New atrial fibrillation  - started on meclizine standing  - prn valium should meclizine not work  - pending TTE and MRI  - monitor on telemetry  - check lipid panel, TSH and HbA1C  - is significantly orthostatic, likely from deconditioning.  Recommend sitting up in chair, and compression stockings.  Also, can liberalize fluid intake, as long as EF is normal on echocardiogram.      #Polycythemia  #Thrombocytopenia  - ?increased viscosity as cause of symptoms  - still with polycythemia - now with leukocytosis  - no signs or symptoms of infection    PT/OT consult  Eliquis for anticoagulation  Dispo pending clinical improvement  Bowel regimen    Plan of care discussed with stroke team.    >35 minutes spent on this encounter, including face to face with patient, care coordination and documentation.  88F with PMH of HTN presenting with vertigo/dizziness and hypertensive, but not within TNK window.  Also noted to be in new atrial fibrillation.  started on anticoagulation and admitted to the stroke service for further workup and medicine consultation.     #Vertigo  #CVA workup  #HTN  #New atrial fibrillation  - started on meclizine standing  - eliquis 2.5mg BID due to weight and age  - pending TTE  - monitor on telemetry  - check lipid panel, TSH and HbA1C  - is significantly orthostatic, likely from deconditioning.  Recommend sitting up in chair, and compression stockings.  Also, can liberalize fluid intake, as long as EF is normal on echocardiogram.    - started on carvedilol and amlodipine to assist with treatment of orthostatic hypotension - hypotensive in the morning when lying down, decreased carvedilol dose from 6.25 BID to 3.125 BID.  Repeat orthostatic vital signs    #Polycythemia  #Thrombocytopenia  - ?increased viscosity as cause of symptoms  - still with polycythemia - now with leukocytosis  - no signs or symptoms of infection  - outpatient follow up  - JAk2 sent    PT/OT consult  Eliquis for anticoagulation  Dispo pending clinical improvement  Bowel regimen    Plan of care discussed with stroke team.    >35 minutes spent on this encounter, including face to face with patient, care coordination and documentation.  88F with PMH of HTN presenting with vertigo/dizziness and hypertensive, but not within TNK window.  Also noted to be in new atrial fibrillation.  started on anticoagulation and admitted to the stroke service for further workup and medicine consultation.     #Vertigo  #CVA workup  #HTN  #New atrial fibrillation  - continue scheduled meclizine  - eliquis 2.5mg BID due to weight and age  - pending TTE  - outpatient vestibular therapy  - is significantly orthostatic, likely from deconditioning.  Recommend sitting up in chair, and compression stockings.  Also, can liberalize fluid intake, as long as EF is normal on echocardiogram.    - started on carvedilol and amlodipine to assist with treatment of orthostatic hypotension - hypotensive in the morning when lying down, decreased carvedilol dose from 6.25 BID to 3.125 BID.  Repeat orthostatic vital signs    #Polycythemia  #Thrombocytopenia  - ?increased viscosity as cause of symptoms  - still with polycythemia - now with leukocytosis  - no signs or symptoms of infection  - outpatient follow up  - JAk2 sent    PT/OT consult  Eliquis for anticoagulation  Dispo pending clinical improvement  Bowel regimen    Plan of care discussed with stroke team.    >35 minutes spent on this encounter, including face to face with patient, care coordination and documentation.

## 2023-06-08 NOTE — PROGRESS NOTE ADULT - SUBJECTIVE AND OBJECTIVE BOX
Neurology Stroke Progress Note    INTERVAL HPI/OVERNIGHT EVENTS:  Patient seen and examined. BP dropped to 90s while laying down, asx.     MEDICATIONS  (STANDING):  amLODIPine   Tablet 5 milliGRAM(s) Oral daily  apixaban 2.5 milliGRAM(s) Oral every 12 hours  atorvastatin 10 milliGRAM(s) Oral at bedtime  carvedilol 3.125 milliGRAM(s) Oral every 12 hours  magnesium oxide 400 milliGRAM(s) Oral once  meclizine 12.5 milliGRAM(s) Oral three times a day  polyethylene glycol 3350 17 Gram(s) Oral daily  potassium chloride   Powder 20 milliEquivalent(s) Oral once  senna 2 Tablet(s) Oral at bedtime    MEDICATIONS  (PRN):      Allergies    penicillin (Anaphylaxis)    Intolerances        Vital Signs Last 24 Hrs  T(C): 36.9 (08 Jun 2023 09:08), Max: 36.9 (07 Jun 2023 23:03)  T(F): 98.4 (08 Jun 2023 09:08), Max: 98.4 (07 Jun 2023 23:03)  HR: 84 (08 Jun 2023 10:57) (80 - 105)  BP: 137/60 (08 Jun 2023 10:57) (92/44 - 189/88)  BP(mean): 87 (08 Jun 2023 10:57) (64 - 127)  RR: 19 (08 Jun 2023 10:57) (15 - 25)  SpO2: 97% (08 Jun 2023 10:57) (94% - 98%)    Parameters below as of 08 Jun 2023 10:57  Patient On (Oxygen Delivery Method): room air        Physical Exam:  General: No acute distress, awake and alert  Cardiovascular: Regular rate and rhythm, no murmurs, rubs, or gallops. No bruits  Pulmonary: Anterior breath sounds clear bilaterally, no crackles or wheezing. No use of accessory muscles  GI: Abdomen soft, non-tender, non-distended    Neurologic:  -Mental status: Awake, alert, oriented to person, place, and time. Speech is fluent with intact naming, repetition, and comprehension, no dysarthria. Recent and remote memory intact. Follows commands. Attention/concentration intact.   -Cranial nerves:   II: Visual fields are full to confrontation.  III, IV, VI: Extraocular movements are intact persistent right beating nystagmus noted. Pupils equally round and reactive to light  V:  Facial sensation V1-V3 equal and intact   VII: symmetric  Motor: Normal bulk and tone. No pronator drift. Strength bilateral upper extremity 5/5, bilateral lower extremities 5/5.  Rapid alternating movements intact and symmetric  Sensation: Intact to light touch bilaterally. No neglect or extinction on double simultaneous testing.  Coordination: No dysmetria on finger-to-nose bilaterally  Gait: Deferred    LABS:                        15.8   10.14 )-----------( 115      ( 08 Jun 2023 05:30 )             48.2     06-08    138  |  104  |  12  ----------------------------<  132<H>  3.9   |  22  |  0.82    Ca    9.0      08 Jun 2023 05:30  Phos  3.3     06-08  Mg     1.9     06-08            RADIOLOGY & ADDITIONAL TESTS:    reviewed

## 2023-06-08 NOTE — PROGRESS NOTE ADULT - ASSESSMENT
88y Female with PMHx of HTN (patient report this to be intermittent, not on any medications), maintains a vegan diet presents to the ED with vertigo with room spinning with exacerbation of symptoms with any movement and relief of symptoms with rest. Stroke code called on arrival to the ED. NIHSS 0. Exam significant for right beating nystagmus. Found to be in A fib on EKG, hypertensive with systolic BP in the 200's. Given 5mg IV lopressor with good effect. CTH negative for any acute intracranial pathology. CTA negative for any large vessel occlusion or high grade stenosis. CTP negative for perfusion deficit. Patient out of the window for TNK, not a candidate for mechanical thrombectomy. Although suspicion for stroke is low given relief of symptoms at rest, given new onset Afib, admitted to stroke service for further workup.     Neuro  #CVA workup  - continue Eliquis 2.5mg BID  - continue atorvastatin 10mg daily  - q4hr stroke neuro checks and vitals  - MRI brain negative for acute ischemia  - Stroke Code HCT Results: negative  - Stroke Code CTA Results: negative    #Vertigo  - Meclizine 12.5mg TID standing  - nausea resolved    Cards  - sBP goal gradual normotension, <160 for now  - patient not on any hypertensive meds at home  - obtain TTE   - Stroke Code EKG Results: Atrial fibrillation w/ RVR   - c/w Eliquis 2.5mg BID  - cards consult placed for new onset afib and orthostatic hypotension, recs below:  #A fib - rates reasonable at present but needs chronic therapy for rate control  Recommend starting 6.25 of carvedilol bid - patient BP dropped to 90s while laying down, asx, so started coreg 3.125mg BID instead.  Agree w/ apixaban 2.5 bid age/weight.    #HTN - uncontrolled and getting PRN medications frequently.   Start coreg as above  Amlodipine 5mg to start  Minimize PRN pushes of hydral best optimized w/ PO meds and this may take time to take effect. --> iv anti-hypertensives/hydral may also be a reason for the significant orthostatic hypotensive response -- they act as pure arteriolar dilators.     #Orthostatic hypotension - 166/82 lying () to 158/74 sit (HR 98), 107/57 standing (). Unclear if pt is symptomatic with this.  - Lack of increase in HR may suggest a neurogenic etiology (vs due to the iv pushes of beta blockers) for the OH and recommend consideration of autonomic disorder per neurology team. However, as above would reassess when the pt is not getting iv pushes of hydral and antihypertensives.  Optimization of HTN is still recommended in setting of orthostatic hypotension   Pt appears euvolemic -- no need for IVF      #HLD  - high dose statin as above in CVA  - LDL results: 89    Pulm  - call provider if SPO2 < 94%    GI  #Nutrition/Fluids/Electrolytes   - replete K<4 and Mg <2  - Diet: Vegan    Renal  - Trend BMP    Infectious Disease  - Stroke Code CXR results: No evidence of acute cardiopulmonary disease    Endocrine  - A1C results: 5.8  - TSH results: 1.740    Heme  #polycythemia vera  - f/u RONEY protein   - LFTs outpatient     DVT Prophylaxis  - c/w Eliquis   - SCDs for DVT prophylaxis       IDR Goals: Goals reviewed at interdisciplinary rounds with case management, social work, physical therapy, occupational therapy, and speech language pathology.   Please see specific therapy  notes for in depth goals.    Dispo: pending PT/OT     Discussed daily hospital plans and goals with patient and  and daughter at bedside.     Discussed with Dr. Sanchez

## 2023-06-08 NOTE — PROVIDER CONTACT NOTE (OTHER) - RECOMMENDATIONS
Stroke ACP aware
medication
medication
Monitor pt, PA will come and assess pt
Stroke ACP Bharati made aware

## 2023-06-08 NOTE — PROVIDER CONTACT NOTE (OTHER) - BACKGROUND
c/o vertigo
vertigo new onset afib
Pt admit c/o vertigo
vertigo
Pt admitted for vertigo-hypertension (sBP-200)

## 2023-06-08 NOTE — PROGRESS NOTE ADULT - ASSESSMENT
I M    88 y o Female with PMHx of HTN (patient report this to be intermittent, not on any medications), maintains a vegan diet presents to the ED with vertigo with room spinning with exacerbation of symptoms with any movement and relief of symptoms with rest. Stroke code called on arrival to the ED. NIHSS 0. Exam significant for right beating nystagmus. Found to be in A fib on EKG, hypertensive with systolic BP in the 200's. Given 5mg IV lopressor with good effect. CTH negative for any acute intracranial pathology. CTA negative for any large vessel occlusion or high grade stenosis. CTP negative for perfusion deficit. Patient out of the window for TNK, not a candidate for mechanical thrombectomy. Although suspicion for stroke is low given relief of symptoms at rest, given new onset Afib, admitted to stroke service for further workup.     Neuro  #CVA workup  - continue Eliquis 2.5mg BID  - continue atorvastatin 10mg daily  - q4hr stroke neuro checks and vitals  - MRI brain negative for acute ischemia  - Stroke Code HCT Results: negative  - Stroke Code CTA Results: negative    #Vertigo  - Meclizine 12.5mg TID standing  - nausea resolved    Cards  - sBP goal gradual normotension, <160 for now  - patient not on any hypertensive meds at home  - obtain TTE   - Stroke Code EKG Results: Atrial fibrillation w/ RVR   - c/w Eliquis 2.5mg BID  - cards consult placed for new onset afib and orthostatic hypotension, recs below:  #A fib - rates reasonable at present but needs chronic therapy for rate control  Recommend starting 6.25 of carvedilol bid - patient BP dropped to 90s while laying down, asx, so started coreg 3.125mg BID instead.  Agree w/ apixaban 2.5 bid age/weight.    #HTN - uncontrolled and getting PRN medications frequently.   Start coreg as above  Amlodipine 5mg to start  Minimize PRN pushes of hydral best optimized w/ PO meds and this may take time to take effect. --> iv anti-hypertensives/hydral may also be a reason for the significant orthostatic hypotensive response -- they act as pure arteriolar dilators.     #Orthostatic hypotension - 166/82 lying () to 158/74 sit (HR 98), 107/57 standing (). Unclear if pt is symptomatic with this.  - Lack of increase in HR may suggest a neurogenic etiology (vs due to the iv pushes of beta blockers) for the OH and recommend consideration of autonomic disorder per neurology team. However, as above would reassess when the pt is not getting iv pushes of hydral and antihypertensives.  Optimization of HTN is still recommended in setting of orthostatic hypotension   Pt appears euvolemic -- no need for IVF      #HLD  - high dose statin as above in CVA  - LDL results: 89    Pulm  - call provider if SPO2 < 94%    GI  #Nutrition/Fluids/Electrolytes   - replete K<4 and Mg <2  - Diet: Vegan    Renal  - Trend BMP    Infectious Disease  - Stroke Code CXR results: No evidence of acute cardiopulmonary disease    Endocrine  - A1C results: 5.8  - TSH results: 1.740    Heme  #polycythemia vera  - f/u RONEY protein   - LFTs outpatient     DVT Prophylaxis  - c/w Eliquis   - SCDs for DVT prophylaxis       IDR Goals: Goals reviewed at interdisciplinary rounds with case management, social work, physical therapy, occupational therapy, and speech language pathology.   Please see specific therapy  notes for in depth goals.    Dispo: pending PM&R  /PT/OT

## 2023-06-08 NOTE — PROVIDER CONTACT NOTE (OTHER) - ACTION/TREATMENT ORDERED:
Reassess BP, monitor for any change in status.
Hydralazine to be given
labetolol 5mg ivp
labetolol 5mg ivp
Hydralazine to be given

## 2023-06-08 NOTE — PROVIDER CONTACT NOTE (OTHER) - ASSESSMENT
bp 185/83
BP elevated 189/88. Denies distress at this time NIH=0.
[facility]=0
bp 192/86
Pt is AOx4, no complaints, BP-92/44

## 2023-06-08 NOTE — PROGRESS NOTE ADULT - SUBJECTIVE AND OBJECTIVE BOX
***Note in progress***    OVERNIGHT EVENTS: NAEO    SUBJECTIVE / INTERVAL HPI: Patient seen and examined at bedside. Patient denying chest pain, SOB, palpitations, cough. Patient denies fever, chills, HA, Dizziness, change in vision/hearing, N/V, abdominal pain, diarrhea, constipation, hematochezia/melena, dysuria, hematuria, new onset weakness/numbness, LE pain and/or swelling.    Remaining ROS negative       PHYSICAL EXAM:    General: WDWN  HEENT: NC/AT; PERRL, anicteric sclera; MMM  Neck: supple  Cardiovascular: +S1/S2, RRR  Respiratory: CTA B/L; no W/R/R  Gastrointestinal: soft, NT/ND; +BSx4  Extremities: WWP; no edema, clubbing or cyanosis  Vascular: 2+ radial, DP/PT pulses B/L  Neurological: AAOx3; no focal deficits  Psychiatric: pleasant mood and affect  Dermatologic: no appreciable wounds or damage to the skin    VITAL SIGNS:  Vital Signs Last 24 Hrs  T(C): 36.9 (08 Jun 2023 09:08), Max: 36.9 (07 Jun 2023 23:03)  T(F): 98.4 (08 Jun 2023 09:08), Max: 98.4 (07 Jun 2023 23:03)  HR: 80 (08 Jun 2023 04:00) (80 - 105)  BP: 137/61 (08 Jun 2023 04:00) (133/79 - 189/88)  BP(mean): 88 (08 Jun 2023 04:00) (88 - 127)  RR: 16 (08 Jun 2023 04:00) (15 - 25)  SpO2: 97% (08 Jun 2023 04:00) (96% - 98%)    Parameters below as of 08 Jun 2023 04:00  Patient On (Oxygen Delivery Method): room air          MEDICATIONS:  MEDICATIONS  (STANDING):  amLODIPine   Tablet 5 milliGRAM(s) Oral daily  apixaban 2.5 milliGRAM(s) Oral every 12 hours  atorvastatin 10 milliGRAM(s) Oral at bedtime  carvedilol 6.25 milliGRAM(s) Oral every 12 hours  meclizine 12.5 milliGRAM(s) Oral three times a day  polyethylene glycol 3350 17 Gram(s) Oral daily  senna 2 Tablet(s) Oral at bedtime    MEDICATIONS  (PRN):  diazepam    Tablet 1 milliGRAM(s) Oral every 6 hours PRN if persistently dizzy even with standing meclizine  ondansetron Injectable 4 milliGRAM(s) IV Push every 6 hours PRN Nausea and/or Vomiting      ALLERGIES:  Allergies    penicillin (Anaphylaxis)    Intolerances        LABS:                        15.8   10.14 )-----------( 115      ( 08 Jun 2023 05:30 )             48.2     06-07    142  |  109<H>  |  10  ----------------------------<  118<H>  4.0   |  23  |  0.67    Ca    9.2      07 Jun 2023 05:30  Phos  2.7     06-07  Mg     2.1     06-07          CAPILLARY BLOOD GLUCOSE          RADIOLOGY & ADDITIONAL TESTS: Reviewed. No recurrence of dizziness.  Gets pretty anxious at times, and feels like that doesn't help.  Eager to start moving again and to get out of bed.  States that she is very active normally, and walks a couple of miles per day.  Denies any palpitation or shortness of breath.     OVERNIGHT EVENTS: NAEO      Remaining ROS negative       PHYSICAL EXAM:    General: sitting up in bed, pleasant and conversant  HEENT: NC/AT; MMM  Cardiovascular: +S1/S2, irr irr no mrg  Respiratory: CTA B/L; no W/R/R  Gastrointestinal: soft, NT/ND; +BSx4  Extremities: WWP; no edema or asymmetry  Neurological: mild rightward beating nystagmus, follows commands, speech is fluent  Psychiatric: pleasant mood and affect  Dermatologic: no appreciable wounds or damage to the skin    VITAL SIGNS:  Vital Signs Last 24 Hrs  T(C): 36.9 (08 Jun 2023 09:08), Max: 36.9 (07 Jun 2023 23:03)  T(F): 98.4 (08 Jun 2023 09:08), Max: 98.4 (07 Jun 2023 23:03)  HR: 80 (08 Jun 2023 04:00) (80 - 105)  BP: 137/61 (08 Jun 2023 04:00) (133/79 - 189/88)  BP(mean): 88 (08 Jun 2023 04:00) (88 - 127)  RR: 16 (08 Jun 2023 04:00) (15 - 25)  SpO2: 97% (08 Jun 2023 04:00) (96% - 98%)    Parameters below as of 08 Jun 2023 04:00  Patient On (Oxygen Delivery Method): room air          MEDICATIONS:  MEDICATIONS  (STANDING):  amLODIPine   Tablet 5 milliGRAM(s) Oral daily  apixaban 2.5 milliGRAM(s) Oral every 12 hours  atorvastatin 10 milliGRAM(s) Oral at bedtime  carvedilol 6.25 milliGRAM(s) Oral every 12 hours  meclizine 12.5 milliGRAM(s) Oral three times a day  polyethylene glycol 3350 17 Gram(s) Oral daily  senna 2 Tablet(s) Oral at bedtime    MEDICATIONS  (PRN):  diazepam    Tablet 1 milliGRAM(s) Oral every 6 hours PRN if persistently dizzy even with standing meclizine  ondansetron Injectable 4 milliGRAM(s) IV Push every 6 hours PRN Nausea and/or Vomiting      ALLERGIES:  Allergies    penicillin (Anaphylaxis)    Intolerances        LABS:                        15.8   10.14 )-----------( 115      ( 08 Jun 2023 05:30 )             48.2     06-07    142  |  109<H>  |  10  ----------------------------<  118<H>  4.0   |  23  |  0.67    Ca    9.2      07 Jun 2023 05:30  Phos  2.7     06-07  Mg     2.1     06-07          CAPILLARY BLOOD GLUCOSE          RADIOLOGY & ADDITIONAL TESTS: Reviewed.

## 2023-06-09 LAB
ANION GAP SERPL CALC-SCNC: 9 MMOL/L — SIGNIFICANT CHANGE UP (ref 5–17)
BUN SERPL-MCNC: 13 MG/DL — SIGNIFICANT CHANGE UP (ref 7–23)
CALCIUM SERPL-MCNC: 8.8 MG/DL — SIGNIFICANT CHANGE UP (ref 8.4–10.5)
CHLORIDE SERPL-SCNC: 106 MMOL/L — SIGNIFICANT CHANGE UP (ref 96–108)
CO2 SERPL-SCNC: 24 MMOL/L — SIGNIFICANT CHANGE UP (ref 22–31)
CREAT SERPL-MCNC: 0.84 MG/DL — SIGNIFICANT CHANGE UP (ref 0.5–1.3)
EGFR: 68 ML/MIN/1.73M2 — SIGNIFICANT CHANGE UP
GLUCOSE SERPL-MCNC: 93 MG/DL — SIGNIFICANT CHANGE UP (ref 70–99)
HCT VFR BLD CALC: 45.8 % — HIGH (ref 34.5–45)
HGB BLD-MCNC: 14.8 G/DL — SIGNIFICANT CHANGE UP (ref 11.5–15.5)
MAGNESIUM SERPL-MCNC: 2 MG/DL — SIGNIFICANT CHANGE UP (ref 1.6–2.6)
MCHC RBC-ENTMCNC: 31.6 PG — SIGNIFICANT CHANGE UP (ref 27–34)
MCHC RBC-ENTMCNC: 32.3 GM/DL — SIGNIFICANT CHANGE UP (ref 32–36)
MCV RBC AUTO: 97.7 FL — SIGNIFICANT CHANGE UP (ref 80–100)
NRBC # BLD: 0 /100 WBCS — SIGNIFICANT CHANGE UP (ref 0–0)
PHOSPHATE SERPL-MCNC: 3.3 MG/DL — SIGNIFICANT CHANGE UP (ref 2.5–4.5)
PLATELET # BLD AUTO: 107 K/UL — LOW (ref 150–400)
POTASSIUM SERPL-MCNC: 4.2 MMOL/L — SIGNIFICANT CHANGE UP (ref 3.5–5.3)
POTASSIUM SERPL-SCNC: 4.2 MMOL/L — SIGNIFICANT CHANGE UP (ref 3.5–5.3)
RBC # BLD: 4.69 M/UL — SIGNIFICANT CHANGE UP (ref 3.8–5.2)
RBC # FLD: 13.5 % — SIGNIFICANT CHANGE UP (ref 10.3–14.5)
SODIUM SERPL-SCNC: 139 MMOL/L — SIGNIFICANT CHANGE UP (ref 135–145)
WBC # BLD: 8.8 K/UL — SIGNIFICANT CHANGE UP (ref 3.8–10.5)
WBC # FLD AUTO: 8.8 K/UL — SIGNIFICANT CHANGE UP (ref 3.8–10.5)

## 2023-06-09 PROCEDURE — 99232 SBSQ HOSP IP/OBS MODERATE 35: CPT

## 2023-06-09 PROCEDURE — 93306 TTE W/DOPPLER COMPLETE: CPT | Mod: 26

## 2023-06-09 PROCEDURE — 99233 SBSQ HOSP IP/OBS HIGH 50: CPT

## 2023-06-09 RX ORDER — MIDODRINE HYDROCHLORIDE 2.5 MG/1
2.5 TABLET ORAL THREE TIMES A DAY
Refills: 0 | Status: DISCONTINUED | OUTPATIENT
Start: 2023-06-09 | End: 2023-06-10

## 2023-06-09 RX ADMIN — AMLODIPINE BESYLATE 5 MILLIGRAM(S): 2.5 TABLET ORAL at 05:12

## 2023-06-09 RX ADMIN — APIXABAN 2.5 MILLIGRAM(S): 2.5 TABLET, FILM COATED ORAL at 17:20

## 2023-06-09 RX ADMIN — MIDODRINE HYDROCHLORIDE 2.5 MILLIGRAM(S): 2.5 TABLET ORAL at 17:20

## 2023-06-09 RX ADMIN — Medication 12.5 MILLIGRAM(S): at 05:12

## 2023-06-09 RX ADMIN — Medication 12.5 MILLIGRAM(S): at 22:45

## 2023-06-09 RX ADMIN — ATORVASTATIN CALCIUM 10 MILLIGRAM(S): 80 TABLET, FILM COATED ORAL at 22:46

## 2023-06-09 RX ADMIN — Medication 12.5 MILLIGRAM(S): at 13:38

## 2023-06-09 RX ADMIN — SENNA PLUS 2 TABLET(S): 8.6 TABLET ORAL at 22:45

## 2023-06-09 RX ADMIN — CARVEDILOL PHOSPHATE 3.12 MILLIGRAM(S): 80 CAPSULE, EXTENDED RELEASE ORAL at 05:12

## 2023-06-09 RX ADMIN — POLYETHYLENE GLYCOL 3350 17 GRAM(S): 17 POWDER, FOR SOLUTION ORAL at 13:38

## 2023-06-09 RX ADMIN — APIXABAN 2.5 MILLIGRAM(S): 2.5 TABLET, FILM COATED ORAL at 05:12

## 2023-06-09 NOTE — DIETITIAN INITIAL EVALUATION ADULT - PERSON TAUGHT/METHOD
Pt amenable to education; RD provided education in regards to the importance of adequate macro and micronutrients, as well as hydration to support ADLs, maintain energy levels and overall functional/nutritional status. General healthful education provided. Nutrient dense (that are mostly plant-based) foods promoted. Pt was receptive and verbalized understanding./verbal instruction/patient instructed

## 2023-06-09 NOTE — PROGRESS NOTE ADULT - ASSESSMENT
88y Female with PMHx of HTN (patient report this to be intermittent, not on any medications), maintains a vegan diet presents to the ED with vertigo with room spinning with exacerbation of symptoms with any movement and relief of symptoms with rest. Stroke code called on arrival to the ED. NIHSS 0. Exam significant for right beating nystagmus. Found to be in A fib on EKG, hypertensive with systolic BP in the 200's. Given 5mg IV lopressor with good effect. CTH negative for any acute intracranial pathology. CTA negative for any large vessel occlusion or high grade stenosis. CTP negative for perfusion deficit. Patient out of the window for TNK, not a candidate for mechanical thrombectomy. Although suspicion for stroke is low given relief of symptoms at rest, given new onset Afib, admitted to stroke service for further workup.     Neuro  #CVA workup  - continue Eliquis 2.5mg BID  - continue atorvastatin 10mg daily  - q4hr stroke neuro checks and vitals  - MRI brain negative for acute ischemia, etiology of sx likely vertigo  - Stroke Code HCT Results: negative  - Stroke Code CTA Results: negative    #Vertigo  - Meclizine 12.5mg TID standing  - nausea resolved    Cards  - sBP goal gradual normotension, <160 for now  - patient not on any hypertensive meds at home  - obtain TTE   - Stroke Code EKG Results: Atrial fibrillation w/ RVR   - c/w Eliquis 2.5mg BID  - cards consult placed for new onset afib and orthostatic hypotension, recs below:  #A fib - rates reasonable at present but needs chronic therapy for rate control  Recommend starting 6.25 of carvedilol bid - patient BP drops when working with PT, so started coreg 3.125mg BID instead.  Agree w/ apixaban 2.5 bid age/weight.    #HTN - uncontrolled and getting PRN medications frequently.   Start coreg as above  Amlodipine 5mg to start  Minimize PRN pushes of hydral best optimized w/ PO meds and this may take time to take effect. --> iv anti-hypertensives/hydral may also be a reason for the significant orthostatic hypotensive response -- they act as pure arteriolar dilators.     #Orthostatic hypotension - 166/82 lying () to 158/74 sit (HR 98), 107/57 standing (). Unclear if pt is symptomatic with this.  - Lack of increase in HR may suggest a neurogenic etiology (vs due to the iv pushes of beta blockers) for the OH and recommend consideration of autonomic disorder per neurology team. However, as above would reassess when the pt is not getting iv pushes of hydral and antihypertensives.  Optimization of HTN is still recommended in setting of orthostatic hypotension   Pt appears euvolemic -- no need for IVF  - pressure dropped to 70s when working with PT on 6/9. If TTE normal will start midodine.      #HLD  - high dose statin as above in CVA  - LDL results: 89    Pulm  - call provider if SPO2 < 94%    GI  #Nutrition/Fluids/Electrolytes   - replete K<4 and Mg <2  - Diet: Vegan    Renal  - Trend BMP    Infectious Disease  - Stroke Code CXR results: No evidence of acute cardiopulmonary disease    Endocrine  - A1C results: 5.8  - TSH results: 1.740    Heme  #polycythemia vera  - f/u RONEY protein   - LFTs outpatient     DVT Prophylaxis  - c/w Eliquis   - SCDs for DVT prophylaxis       IDR Goals: Goals reviewed at interdisciplinary rounds with case management, social work, physical therapy, occupational therapy, and speech language pathology.   Please see specific therapy  notes for in depth goals.    Dispo: home PT/OT     Discussed daily hospital plans and goals with patient.    Discussed with Dr. Sanchez     88y Female with PMHx of HTN (patient report this to be intermittent, not on any medications), maintains a vegan diet presents to the ED with vertigo with room spinning with exacerbation of symptoms with any movement and relief of symptoms with rest. Stroke code called on arrival to the ED. NIHSS 0. Exam significant for right beating nystagmus. Found to be in A fib on EKG, hypertensive with systolic BP in the 200's. Given 5mg IV lopressor with good effect. CTH negative for any acute intracranial pathology. CTA negative for any large vessel occlusion or high grade stenosis. CTP negative for perfusion deficit. Patient out of the window for TNK, not a candidate for mechanical thrombectomy. Although suspicion for stroke is low given relief of symptoms at rest, given new onset Afib, admitted to stroke service for further workup.     Neuro  #CVA workup  - continue Eliquis 2.5mg BID  - continue atorvastatin 10mg daily  - q4hr stroke neuro checks and vitals  - MRI brain negative for acute ischemia, etiology of sx likely vertigo  - Stroke Code HCT Results: negative  - Stroke Code CTA Results: negative    #Vertigo  - Meclizine 12.5mg TID standing  - nausea resolved    Cards  - sBP goal gradual normotension, <160 for now  - patient not on any hypertensive meds at home  - TTE without signif abn  - Stroke Code EKG Results: Atrial fibrillation w/ RVR   - c/w Eliquis 2.5mg BID  - cards consult placed for new onset afib and orthostatic hypotension, recs below:  #A fib - rates reasonable at present but needs chronic therapy for rate control  Recommend starting 6.25 of carvedilol bid - patient BP drops when working with PT, so started coreg 3.125mg BID instead then d/c on 6/9 as pressures dropped again. if patient needs rate control can start toprol  Agree w/ apixaban 2.5 bid age/weight.    #HTN - uncontrolled and getting PRN medications frequently.   Amlodipine 5mg to start  Minimize PRN pushes of hydral best optimized w/ PO meds and this may take time to take effect. --> iv anti-hypertensives/hydral may also be a reason for the significant orthostatic hypotensive response -- they act as pure arteriolar dilators.     #Orthostatic hypotension - 166/82 lying () to 158/74 sit (HR 98), 107/57 standing (). Unclear if pt is symptomatic with this.  - Lack of increase in HR may suggest a neurogenic etiology (vs due to the iv pushes of beta blockers) for the OH and recommend consideration of autonomic disorder per neurology team. However, as above would reassess when the pt is not getting iv pushes of hydral and antihypertensives.  Optimization of HTN is still recommended in setting of orthostatic hypotension   Pt appears euvolemic -- no need for IVF  - pressure dropped to 70s when working with PT on 6/9. d/c coreg and started midodrine      #HLD  - high dose statin as above in CVA  - LDL results: 89    Pulm  - call provider if SPO2 < 94%    GI  #Nutrition/Fluids/Electrolytes   - replete K<4 and Mg <2  - Diet: Vegan    Renal  - Trend BMP    Infectious Disease  - Stroke Code CXR results: No evidence of acute cardiopulmonary disease    Endocrine  - A1C results: 5.8  - TSH results: 1.740    Heme  #polycythemia vera  - f/u RONEY protein   - LFTs outpatient     DVT Prophylaxis  - c/w Eliquis   - SCDs for DVT prophylaxis       IDR Goals: Goals reviewed at interdisciplinary rounds with case management, social work, physical therapy, occupational therapy, and speech language pathology.   Please see specific therapy  notes for in depth goals.    Dispo: home PT/OT     Discussed daily hospital plans and goals with patient.    Discussed with Dr. Sanchez     88y Female with PMHx of HTN (patient report this to be intermittent, not on any medications), maintains a vegan diet presents to the ED with vertigo with room spinning with exacerbation of symptoms with any movement and relief of symptoms with rest. Stroke code called on arrival to the ED. NIHSS 0. Exam significant for right beating nystagmus. Found to be in A fib on EKG, hypertensive with systolic BP in the 200's. Given 5mg IV lopressor with good effect. CTH negative for any acute intracranial pathology. CTA negative for any large vessel occlusion or high grade stenosis. CTP negative for perfusion deficit. Patient out of the window for TNK, not a candidate for mechanical thrombectomy. Although suspicion for stroke is low given relief of symptoms at rest, given new onset Afib, admitted to stroke service for further workup.     Neuro  #CVA workup  - continue Eliquis 2.5mg BID  - continue atorvastatin 10mg daily  - q4hr stroke neuro checks and vitals  - MRI brain negative for acute ischemia, etiology of sx likely vertigo  - Stroke Code HCT Results: negative  - Stroke Code CTA Results: negative    #Vertigo  - Meclizine 12.5mg TID standing  - nausea resolved    Cards  - sBP goal gradual normotension, <160 for now  - patient not on any hypertensive meds at home  - ECHO with normal LVEF, mild LVF, mild-mod AS, mild AI, mild- mod MR/TR, small pericardial effusion   - Stroke Code EKG Results: Atrial fibrillation w/ RVR   - c/w Eliquis 2.5mg BID  - cards consult placed for new onset afib and orthostatic hypotension, recs below:  #A fib - rates reasonable at present but needs chronic therapy for rate control  Recommend starting 6.25 of carvedilol bid - patient BP drops when working with PT, so started coreg 3.125mg BID instead then d/c on 6/9 as pressures dropped again. if patient needs rate control can start toprol  Agree w/ apixaban 2.5 bid age/weight.    #HTN - uncontrolled and getting PRN medications frequently.   Amlodipine 5mg to start  Minimize PRN pushes of hydral best optimized w/ PO meds and this may take time to take effect. --> iv anti-hypertensives/hydral may also be a reason for the significant orthostatic hypotensive response -- they act as pure arteriolar dilators.     #Orthostatic hypotension - 166/82 lying () to 158/74 sit (HR 98), 107/57 standing (). Unclear if pt is symptomatic with this.  - Lack of increase in HR may suggest a neurogenic etiology (vs due to the iv pushes of beta blockers) for the OH and recommend consideration of autonomic disorder per neurology team. However, as above would reassess when the pt is not getting iv pushes of hydral and antihypertensives.  Optimization of HTN is still recommended in setting of orthostatic hypotension   Pt appears euvolemic -- no need for IVF  - pressure dropped to 70s when working with PT on 6/9. d/c coreg and started midodrine      #HLD  - high dose statin as above in CVA  - LDL results: 89    Pulm  - call provider if SPO2 < 94%    GI  #Nutrition/Fluids/Electrolytes   - replete K<4 and Mg <2  - Diet: Vegan    Renal  - Trend BMP    Infectious Disease  - Stroke Code CXR results: No evidence of acute cardiopulmonary disease    Endocrine  - A1C results: 5.8  - TSH results: 1.740    Heme  #polycythemia vera  - f/u RONEY protein   - LFTs outpatient     DVT Prophylaxis  - c/w Eliquis   - SCDs for DVT prophylaxis       IDR Goals: Goals reviewed at interdisciplinary rounds with case management, social work, physical therapy, occupational therapy, and speech language pathology.   Please see specific therapy  notes for in depth goals.    Dispo: home PT/OT     Discussed daily hospital plans and goals with patient.    Discussed with Dr. Sanchez     88y Female with PMHx of HTN (patient report this to be intermittent, not on any medications), maintains a vegan diet presents to the ED with vertigo with room spinning with exacerbation of symptoms with any movement and relief of symptoms with rest. Stroke code called on arrival to the ED. NIHSS 0. Exam significant for right beating nystagmus. Found to be in A fib on EKG, hypertensive with systolic BP in the 200's. Given 5mg IV lopressor with good effect. CTH negative for any acute intracranial pathology. CTA negative for any large vessel occlusion or high grade stenosis. CTP negative for perfusion deficit. Patient out of the window for TNK, not a candidate for mechanical thrombectomy. Although suspicion for stroke is low given relief of symptoms at rest, given new onset Afib, admitted to stroke service for further workup.     Neuro  #CVA workup  - continue Eliquis 2.5mg BID  - continue atorvastatin 10mg daily  - q4hr stroke neuro checks and vitals  - MRI brain negative for acute ischemia, etiology of sx likely vertigo  - Stroke Code HCT Results: negative  - Stroke Code CTA Results: negative    #Vertigo  - Meclizine 12.5mg TID standing  - nausea resolved    Cards  - sBP goal gradual normotension, <160 for now  - patient not on any hypertensive meds at home  - ECHO with normal LVEF, mild LVF, mild-mod AS, mild AI, mild- mod MR/TR, small pericardial effusion   - Stroke Code EKG Results: Atrial fibrillation w/ RVR   - c/w Eliquis 2.5mg BID  - cards consult placed for new onset afib and orthostatic hypotension, recs below:  #A fib - rates reasonable at present but needs chronic therapy for rate control  Recommend starting 6.25 of carvedilol bid - patient BP drops when working with PT, so started coreg 3.125mg BID instead then d/c on 6/9 as pressures dropped again. if patient needs rate control can start toprol    #HTN - uncontrolled and getting PRN medications frequently.   Amlodipine 5mg to start  Minimize PRN pushes of hydral best optimized w/ PO meds and this may take time to take effect. --> iv anti-hypertensives/hydral may also be a reason for the significant orthostatic hypotensive response -- they act as pure arteriolar dilators.     #Orthostatic hypotension - 166/82 lying () to 158/74 sit (HR 98), 107/57 standing (). Unclear if pt is symptomatic with this.  - Lack of increase in HR may suggest a neurogenic etiology (vs due to the iv pushes of beta blockers) for the OH and recommend consideration of autonomic disorder per neurology team. However, as above would reassess when the pt is not getting iv pushes of hydral and antihypertensives.  Optimization of HTN is still recommended in setting of orthostatic hypotension   Pt appears euvolemic -- no need for IVF  - pressure dropped to 70s when working with PT on 6/9. d/c coreg and started midodrine      #HLD  - high dose statin as above in CVA  - LDL results: 89    Pulm  - call provider if SPO2 < 94%    GI  #Nutrition/Fluids/Electrolytes   - replete K<4 and Mg <2  - Diet: Vegan    Renal  - Trend BMP    Infectious Disease  - Stroke Code CXR results: No evidence of acute cardiopulmonary disease    Endocrine  - A1C results: 5.8  - TSH results: 1.740    Heme  #polycythemia vera  - f/u RONEY protein   - LFTs outpatient     DVT Prophylaxis  - c/w Eliquis   - SCDs for DVT prophylaxis       IDR Goals: Goals reviewed at interdisciplinary rounds with case management, social work, physical therapy, occupational therapy, and speech language pathology.   Please see specific therapy  notes for in depth goals.    Dispo: home PT/OT     Discussed daily hospital plans and goals with patient.    Discussed with Dr. Sanchez     88y Female with PMHx of HTN (patient report this to be intermittent, not on any medications), maintains a vegan diet presents to the ED with vertigo with room spinning with exacerbation of symptoms with any movement and relief of symptoms with rest. Stroke code called on arrival to the ED. NIHSS 0. Exam significant for right beating nystagmus. Found to be in A fib on EKG, hypertensive with systolic BP in the 200's. Given 5mg IV lopressor with good effect. CTH negative for any acute intracranial pathology. CTA negative for any large vessel occlusion or high grade stenosis. CTP negative for perfusion deficit. Patient out of the window for TNK, not a candidate for mechanical thrombectomy. Although suspicion for stroke is low given relief of symptoms at rest, given new onset Afib, admitted to stroke service for further workup.     Neuro  #CVA workup  - continue Eliquis 2.5mg BID  - continue atorvastatin 10mg daily  - q4hr stroke neuro checks and vitals  - MRI brain negative for acute ischemia, etiology of sx likely vertigo  - Stroke Code HCT Results: negative  - Stroke Code CTA Results: negative    #Vertigo  - Meclizine 12.5mg TID standing  - nausea resolved    Cards  - sBP goal gradual normotension, <160 for now  - patient not on any hypertensive meds at home  - ECHO with normal LVEF, mild LVF, mild-mod AS, mild AI, mild- mod MR/TR, small pericardial effusion   - Stroke Code EKG Results: Atrial fibrillation w/ RVR   - c/w Eliquis 2.5mg BID  - cards consult placed for new onset afib and orthostatic hypotension, recs below:  #A fib - rates reasonable at present but needs chronic therapy for rate control  Recommend starting 6.25 of carvedilol bid - patient BP drops when working with PT, so started coreg 3.125mg BID instead then d/c on 6/9 as pressures dropped again. if patient needs rate control can start toprol    #HTN - uncontrolled and getting PRN medications frequently.   Amlodipine 5mg to start  Minimize PRN pushes of hydral best optimized w/ PO meds and this may take time to take effect. --> iv anti-hypertensives/hydral may also be a reason for the significant orthostatic hypotensive response -- they act as pure arteriolar dilators.     #Orthostatic hypotension - 166/82 lying () to 158/74 sit (HR 98), 107/57 standing (). Unclear if pt is symptomatic with this.  - Lack of increase in HR may suggest a neurogenic etiology (vs due to the iv pushes of beta blockers) for the OH and recommend consideration of autonomic disorder per neurology team. However, as above would reassess when the pt is not getting iv pushes of hydral and antihypertensives.  Optimization of HTN is still recommended in setting of orthostatic hypotension   Pt appears euvolemic -- no need for IVF  - pressure dropped to 70s when working with PT on 6/9. d/c coreg and started midodrine      #HLD  - high dose statin as above in CVA  - LDL results: 89    Pulm  - call provider if SPO2 < 94%    GI  #Nutrition/Fluids/Electrolytes   - replete K<4 and Mg <2  - Diet: Vegan    Renal  - Trend BMP    Infectious Disease  - Stroke Code CXR results: No evidence of acute cardiopulmonary disease    Endocrine  - A1C results: 5.8  - TSH results: 1.740    Heme  #polycythemia vera  - f/u RONEY protein   - LFTs outpatient     DVT Prophylaxis  - c/w Eliquis   - SCDs for DVT prophylaxis       IDR Goals: Goals reviewed at interdisciplinary rounds with case management, social work, physical therapy, occupational therapy, and speech language pathology.   Please see specific therapy  notes for in depth goals.    Dispo: home PT/OT pending better BP stability     Discussed daily hospital plans and goals with patient.    Discussed with Dr. Sanchez

## 2023-06-09 NOTE — DIETITIAN INITIAL EVALUATION ADULT - OTHER CALCULATIONS
Based on Standards of Care pt >% IBW thus ideal body weight used for all calculations. Needs adjusted for advanced age and clinical status.

## 2023-06-09 NOTE — PROGRESS NOTE ADULT - ASSESSMENT
88F w/ untreated HTN, probable dementia p/w peripheral vertigo was found to be in a fib (new dx). Cardiology consulted for new afib.     #Atrial fibrillation  Patient with newly diagnosed afib with risk factors (age, HTN), currently rate controlled   - Continue Coreg 3.125mg BID, can uptitrate if needed but would check orthostatics first and consider fluids if positive   - Continue Eliquis 2.5mg BID   - Pending TTE        To be d/w attending  [facility] 88F w/ untreated HTN, probable dementia p/w peripheral vertigo was found to be in a fib (new dx). Cardiology consulted for new afib.     #Atrial fibrillation  Patient with newly diagnosed afib with risk factors (age, HTN), currently rate controlled  TTE 6/9/23: EF normal, mild LVH, mild AR, mild to mod MR, mild to mod TR   - Would discontinue coreg for now given orthostatic hypotension   - Continue Eliquis 2.5mg BID    #Orthostatic hypotension  Patient with orthostasis, possible autonomic dysfunction? Doesn't seem hypovolemic   - Would consider midodrine 2.5mg TID    To be d/w attending  [facility]

## 2023-06-09 NOTE — PROGRESS NOTE ADULT - TIME BILLING
Can you put in a referral for patient for [facility] [place] for a colonoscopy?    Thank you!  Naomi   Referral Coordinator    
Review of chart information, interpretation of data, evaluation of patient, coordination of care.
as noted above

## 2023-06-09 NOTE — PROGRESS NOTE ADULT - ATTENDING COMMENTS
Initial attending contact date  6/8/23    . See fellow note written above for details. I reviewed the fellow documentation. I have personally seen and examined this patient. I reviewed vitals, labs, medications, cardiac studies, and additional imaging. I agree with the above fellow's findings and plans as written above with the following additions/statements.    88F w/ untreated HTN, probable dementia p/w peripheral vertigo was found to be in a fib (new dx) and orthostatic hypotension.  -ECHO with normal LVEF, mild LVF, mild-mod AS, mild AI, mild- mod MR/TR, small pericardial effusion   - on tele rate controlled A fib in 70s. Agree with AC with lower dose eliquis   -Still profoundly orthostatic ( -> 70) however without tachycardia. Does not appear dry or hypovolemic on exam  -Would hold off on coreg and amlodipine for now and re-assess orthostatics. Rec LE compressions stockings, abdominal binder.  -If persistent orthostatics and BP stable, rec low dose midodrine 2.5 mg tid vs eval for neurogenic component of orthostatic hypotension  -ECHO with normal LVEF, mild LVF, mild-mod AS, mild AI, mild- mod MR/TR, small pericardial effusion   -Will cont to follow

## 2023-06-09 NOTE — PROGRESS NOTE ADULT - ASSESSMENT
88F with PMH of HTN presenting with vertigo/dizziness and hypertensive, but not within TNK window.  Also noted to be in new atrial fibrillation.  started on anticoagulation and admitted to the stroke service for further workup and medicine consultation.     #Vertigo  #CVA workup  #HTN  #New atrial fibrillation  - continue scheduled meclizine  - eliquis 2.5mg BID  - pending TTE - will happen today, as well as physical therapy  - dose of carvedilol reduced yesterday due to hypotension - improvement in blood pressure with 3.125 BID.  Continue on amlodipine 5mg daily.   - outpatient vestibular therapy    #Polycythemia  #Thrombocytopenia  - ?increased viscosity as cause of symptoms  - still with polycythemia - now with leukocytosis  - no signs or symptoms of infection  - outpatient follow up  - JAK2 pending collection    PT/OT consult  Eliquis for anticoagulation  Dispo pending clinical improvement  Bowel regimen    Plan of care discussed with stroke team.    >35 minutes spent on this encounter, including face to face with patient, care coordination and documentation.

## 2023-06-09 NOTE — DIETITIAN INITIAL EVALUATION ADULT - ADD RECOMMEND
1. Continue with current diet order  >>consider ONS/nourishments if pt's PO intakes decrease  2. Encourage pt to meet nutritional needs as able   3. Monitor PO intakes, trend weights (weekly), monitor skin integrity, monitor labs (electrolytes, CMP), monitor GI fxn   4. Encourage adherence to diet education (reinforce as able)   5. Pain and bowel regimen per team   6. Will continue to assess/honor preferences as able   7. Align nutrition interventions with GOC at all times

## 2023-06-09 NOTE — DIETITIAN INITIAL EVALUATION ADULT - PERTINENT MEDS FT
MEDICATIONS  (STANDING):  amLODIPine   Tablet 5 milliGRAM(s) Oral daily  apixaban 2.5 milliGRAM(s) Oral every 12 hours  atorvastatin 10 milliGRAM(s) Oral at bedtime  carvedilol 3.125 milliGRAM(s) Oral every 12 hours  meclizine 12.5 milliGRAM(s) Oral three times a day  polyethylene glycol 3350 17 Gram(s) Oral daily  senna 2 Tablet(s) Oral at bedtime    MEDICATIONS  (PRN):

## 2023-06-09 NOTE — DIETITIAN INITIAL EVALUATION ADULT - OTHER INFO
88y Female with PMHx of HTN (patient report this to be intermittent, not on any medications), maintains a vegan diet presents to the ED with vertigo with room spinning with exacerbation of symptoms with any movement and relief of symptoms with rest. Stroke code called on arrival to the ED. NIHSS 0. Exam significant for right beating nystagmus. Found to be in A fib on EKG, hypertensive with systolic BP in the 200's. Given 5mg IV lopressor with good effect. CTH negative for any acute intracranial pathology. CTA negative for any large vessel occlusion or high grade stenosis. CTP negative for perfusion deficit. Patient out of the window for TNK, not a candidate for mechanical thrombectomy. Although suspicion for stroke is low given relief of symptoms at rest, given new onset Afib, admitted to stroke service for further workup.     Pt seen in room for nutrition assessment. Pt reports fair appetite PTA and during hospital stay. As per diet recall PTA: pt endorsed she eats lots of fruits and vegetables, hot cereal, salmon sometimes, eggs very rarely, follows a "mostly vegan" diet. Currently on regular diet, tolerating well, noted with fair, ~50-75% PO intakes overall. Noted with mainly vegan/plant-based food preferences noted. NKFA. Denies wt changes, reports wt stability at current wt. Dosing wt: 144#, IBW: 110#, pt is 131% of IBW. Denies N/V/D/C, no distention noted, soft, nontender abdomen, last BM on 6/8/23, moderate stool noted. No edema. Skin intact. Basim: 17. No issues chewing or swallowing noted. Denies pain. Labs reviewed: no pertinent nutrition-related labs at this time; RD to continue to monitor trends. Nutritionally pertinent medications/supplements: senna, Miralax. RD observed pt with no overt signs of muscle or fat wasting. Based on ASPEN guidelines, pt does not meet criteria for malnutrition at this time. Pt amenable to education; RD provided education in regards to the importance of adequate macro and micronutrients, as well as hydration to support ADLs, maintain energy levels and overall functional/nutritional status. General healthful education provided. Nutrient dense (that are mostly plant-based) foods promoted. Pt was receptive and verbalized understanding. No additional nutrition-related concerns. Will continue to follow per RD protocol. Additional nutrition recommendations below to follow.

## 2023-06-09 NOTE — PROGRESS NOTE ADULT - SUBJECTIVE AND OBJECTIVE BOX
INTERVAL EVENTS:    PAST MEDICAL & SURGICAL HISTORY:      MEDICATIONS  (STANDING):  amLODIPine   Tablet 5 milliGRAM(s) Oral daily  apixaban 2.5 milliGRAM(s) Oral every 12 hours  atorvastatin 10 milliGRAM(s) Oral at bedtime  carvedilol 3.125 milliGRAM(s) Oral every 12 hours  meclizine 12.5 milliGRAM(s) Oral three times a day  polyethylene glycol 3350 17 Gram(s) Oral daily  senna 2 Tablet(s) Oral at bedtime    MEDICATIONS  (PRN):    T(F): 98.4 (06-09-23 @ 04:30), Max: 98.7 (06-08-23 @ 13:54)  HR: 65 (06-09-23 @ 04:00) (65 - 87)  BP: 139/65 (06-09-23 @ 04:00) (92/44 - 139/65)  BP(mean): 93 (06-09-23 @ 04:00) (64 - 93)  ABP: --  ABP(mean): --  RR: 17 (06-09-23 @ 04:00) (15 - 25)  SpO2: 92% (06-09-23 @ 04:00) (92% - 97%)    I/O Detail 24H    08 Jun 2023 07:01  -  09 Jun 2023 07:00  --------------------------------------------------------  IN:    Oral Fluid: 620 mL  Total IN: 620 mL    OUT:    Voided (mL): 1220 mL  Total OUT: 1220 mL    Total NET: -600 mL          PHYSICAL EXAM:  GEN: NAD  HEENT: EOMI   RESP: CTA b/l  CV: RRR. Normal S1/S2. No m/r/g.  ABD: soft, non-distended  EXT: No edema   NEURO: alert and attentive    LABS:  CBC 06-09-23 @ 05:30                        14.8   8.80  )-----------( 107                   45.8       Hgb trend: 14.8 <-- , 15.8 <-- , 17.3 <--   WBC trend: 8.80 <-- , 10.14 <-- , 13.54 <--       CMP 06-09-23 @ 05:30    139  |  106  |  13  ----------------------------<  93  4.2   |  24  |  0.84    Ca    8.8      06-09-23 @ 05:30  Phos  3.3     06-09  Mg     2.0     06-09        Serum Cr trend: 0.84 <-- , 0.82 <-- , 0.67 <--         Cardiac Markers           STUDIES:           INTERVAL EVENTS: no acute events    PAST MEDICAL & SURGICAL HISTORY:      MEDICATIONS  (STANDING):  amLODIPine   Tablet 5 milliGRAM(s) Oral daily  apixaban 2.5 milliGRAM(s) Oral every 12 hours  atorvastatin 10 milliGRAM(s) Oral at bedtime  carvedilol 3.125 milliGRAM(s) Oral every 12 hours  meclizine 12.5 milliGRAM(s) Oral three times a day  polyethylene glycol 3350 17 Gram(s) Oral daily  senna 2 Tablet(s) Oral at bedtime    MEDICATIONS  (PRN):    T(F): 98.4 (06-09-23 @ 04:30), Max: 98.7 (06-08-23 @ 13:54)  HR: 65 (06-09-23 @ 04:00) (65 - 87)  BP: 139/65 (06-09-23 @ 04:00) (92/44 - 139/65)  BP(mean): 93 (06-09-23 @ 04:00) (64 - 93)  ABP: --  ABP(mean): --  RR: 17 (06-09-23 @ 04:00) (15 - 25)  SpO2: 92% (06-09-23 @ 04:00) (92% - 97%)    I/O Detail 24H    08 Jun 2023 07:01  -  09 Jun 2023 07:00  --------------------------------------------------------  IN:    Oral Fluid: 620 mL  Total IN: 620 mL    OUT:    Voided (mL): 1220 mL  Total OUT: 1220 mL    Total NET: -600 mL          PHYSICAL EXAM:  GEN: NAD  HEENT: EOMI   RESP: CTA b/l  CV: irregularly irregular. Normal S1/S2. No m/r/g.  ABD: soft, non-distended  EXT: No edema   NEURO: alert and attentive    LABS:  CBC 06-09-23 @ 05:30                        14.8   8.80  )-----------( 107                   45.8       Hgb trend: 14.8 <-- , 15.8 <-- , 17.3 <--   WBC trend: 8.80 <-- , 10.14 <-- , 13.54 <--       CMP 06-09-23 @ 05:30    139  |  106  |  13  ----------------------------<  93  4.2   |  24  |  0.84    Ca    8.8      06-09-23 @ 05:30  Phos  3.3     06-09  Mg     2.0     06-09        Serum Cr trend: 0.84 <-- , 0.82 <-- , 0.67 <--         Cardiac Markers           STUDIES:

## 2023-06-09 NOTE — PROGRESS NOTE ADULT - SUBJECTIVE AND OBJECTIVE BOX
Neurology Stroke Progress Note    INTERVAL HPI/OVERNIGHT EVENTS:  Patient seen and examined. Pressure dropped to 70s when working with PT.    MEDICATIONS  (STANDING):  amLODIPine   Tablet 5 milliGRAM(s) Oral daily  apixaban 2.5 milliGRAM(s) Oral every 12 hours  atorvastatin 10 milliGRAM(s) Oral at bedtime  carvedilol 3.125 milliGRAM(s) Oral every 12 hours  meclizine 12.5 milliGRAM(s) Oral three times a day  polyethylene glycol 3350 17 Gram(s) Oral daily  senna 2 Tablet(s) Oral at bedtime    MEDICATIONS  (PRN):      Allergies    penicillin (Anaphylaxis)    Intolerances        Vital Signs Last 24 Hrs  T(C): 36.9 (09 Jun 2023 13:38), Max: 37.1 (08 Jun 2023 13:54)  T(F): 98.5 (09 Jun 2023 13:38), Max: 98.7 (08 Jun 2023 13:54)  HR: 85 (09 Jun 2023 13:17) (65 - 87)  BP: 144/78 (09 Jun 2023 13:17) (121/59 - 144/78)  BP(mean): 106 (09 Jun 2023 13:17) (82 - 106)  RR: 18 (09 Jun 2023 13:17) (15 - 25)  SpO2: 95% (09 Jun 2023 13:17) (92% - 97%)    Parameters below as of 09 Jun 2023 13:17  Patient On (Oxygen Delivery Method): room air      Physical Exam:  General: No acute distress, awake and alert  Cardiovascular: Regular rate and rhythm, no murmurs, rubs, or gallops. No bruits  Pulmonary: Anterior breath sounds clear bilaterally, no crackles or wheezing. No use of accessory muscles  GI: Abdomen soft, non-tender, non-distended    Neurologic:  -Mental status: Awake, alert, oriented to person, place, and time. Speech is fluent with intact naming, repetition, and comprehension, no dysarthria. Recent and remote memory intact. Follows commands. Attention/concentration intact.   -Cranial nerves:   II: Visual fields are full to confrontation.  III, IV, VI: Extraocular movements are intact persistent right beating nystagmus noted. Pupils equally round and reactive to light  V:  Facial sensation V1-V3 equal and intact   VII: symmetric  Motor: Normal bulk and tone. No pronator drift. Strength bilateral upper extremity 5/5, bilateral lower extremities 5/5.  Rapid alternating movements intact and symmetric  Sensation: Intact to light touch bilaterally. No neglect or extinction on double simultaneous testing.  Coordination: No dysmetria on finger-to-nose bilaterally  Gait: Deferred    LABS:                        14.8   8.80  )-----------( 107      ( 09 Jun 2023 05:30 )             45.8     06-09    139  |  106  |  13  ----------------------------<  93  4.2   |  24  |  0.84    Ca    8.8      09 Jun 2023 05:30  Phos  3.3     06-09  Mg     2.0     06-09      RADIOLOGY & ADDITIONAL TESTS:    reviewed

## 2023-06-09 NOTE — DIETITIAN INITIAL EVALUATION ADULT - PERTINENT LABORATORY DATA
06-09    139  |  106  |  13  ----------------------------<  93  4.2   |  24  |  0.84    Ca    8.8      09 Jun 2023 05:30  Phos  3.3     06-09  Mg     2.0     06-09    A1C with Estimated Average Glucose Result: 5.8 % (06-06-23 @ 05:30)

## 2023-06-09 NOTE — PROGRESS NOTE ADULT - SUBJECTIVE AND OBJECTIVE BOX
Feeling well today.  Denies any severe dizziness.  Maybe occasional dizziness when she moves her head but intermittent.  Has been sitting up in a chair and tolerating this well.  Will work with physical therapy.  Having some difficulty sleeping in the hospital, and very eager to get home.     No events reported overnight    Remaining ROS negative       PHYSICAL EXAM:    General: WDWN  HEENT: NC/AT; PERRL, anicteric sclera; MMM  Neck: supple  Cardiovascular: +S1/S2, irr irr  Respiratory: CTA B/L; no W/R/R  Gastrointestinal: soft, NT/ND; +BSx4  Extremities: WWP; no edema, calf asymmetry or tenderness  Neurological: no focal deficits noted, follows commands, moves all extremities, no rightward beating nystagmus on exam today  Psychiatric: pleasant mood and affect  Dermatologic: no appreciable wounds or damage to the skin    VITAL SIGNS:  Vital Signs Last 24 Hrs  T(C): 36.9 (09 Jun 2023 08:41), Max: 37.1 (08 Jun 2023 13:54)  T(F): 98.5 (09 Jun 2023 08:41), Max: 98.7 (08 Jun 2023 13:54)  HR: 84 (09 Jun 2023 09:40) (65 - 87)  BP: 121/59 (09 Jun 2023 09:40) (121/59 - 139/65)  BP(mean): 85 (09 Jun 2023 09:40) (82 - 93)  RR: 20 (09 Jun 2023 09:40) (15 - 25)  SpO2: 97% (09 Jun 2023 09:40) (92% - 97%)    Parameters below as of 09 Jun 2023 09:40  Patient On (Oxygen Delivery Method): room air          MEDICATIONS:  MEDICATIONS  (STANDING):  amLODIPine   Tablet 5 milliGRAM(s) Oral daily  apixaban 2.5 milliGRAM(s) Oral every 12 hours  atorvastatin 10 milliGRAM(s) Oral at bedtime  carvedilol 3.125 milliGRAM(s) Oral every 12 hours  meclizine 12.5 milliGRAM(s) Oral three times a day  polyethylene glycol 3350 17 Gram(s) Oral daily  senna 2 Tablet(s) Oral at bedtime    MEDICATIONS  (PRN):      ALLERGIES:  Allergies    penicillin (Anaphylaxis)    Intolerances        LABS:                        14.8   8.80  )-----------( 107      ( 09 Jun 2023 05:30 )             45.8     06-09    139  |  106  |  13  ----------------------------<  93  4.2   |  24  |  0.84    Ca    8.8      09 Jun 2023 05:30  Phos  3.3     06-09  Mg     2.0     06-09          CAPILLARY BLOOD GLUCOSE          RADIOLOGY & ADDITIONAL TESTS: Reviewed.

## 2023-06-10 LAB
ANION GAP SERPL CALC-SCNC: 10 MMOL/L — SIGNIFICANT CHANGE UP (ref 5–17)
BUN SERPL-MCNC: 16 MG/DL — SIGNIFICANT CHANGE UP (ref 7–23)
CALCIUM SERPL-MCNC: 9.3 MG/DL — SIGNIFICANT CHANGE UP (ref 8.4–10.5)
CHLORIDE SERPL-SCNC: 107 MMOL/L — SIGNIFICANT CHANGE UP (ref 96–108)
CO2 SERPL-SCNC: 23 MMOL/L — SIGNIFICANT CHANGE UP (ref 22–31)
CREAT SERPL-MCNC: 0.8 MG/DL — SIGNIFICANT CHANGE UP (ref 0.5–1.3)
EGFR: 72 ML/MIN/1.73M2 — SIGNIFICANT CHANGE UP
GLUCOSE SERPL-MCNC: 88 MG/DL — SIGNIFICANT CHANGE UP (ref 70–99)
HCT VFR BLD CALC: 45.1 % — HIGH (ref 34.5–45)
HGB BLD-MCNC: 14.7 G/DL — SIGNIFICANT CHANGE UP (ref 11.5–15.5)
MAGNESIUM SERPL-MCNC: 2.1 MG/DL — SIGNIFICANT CHANGE UP (ref 1.6–2.6)
MCHC RBC-ENTMCNC: 31.5 PG — SIGNIFICANT CHANGE UP (ref 27–34)
MCHC RBC-ENTMCNC: 32.6 GM/DL — SIGNIFICANT CHANGE UP (ref 32–36)
MCV RBC AUTO: 96.8 FL — SIGNIFICANT CHANGE UP (ref 80–100)
NRBC # BLD: 0 /100 WBCS — SIGNIFICANT CHANGE UP (ref 0–0)
PHOSPHATE SERPL-MCNC: 4.7 MG/DL — HIGH (ref 2.5–4.5)
PLATELET # BLD AUTO: 94 K/UL — LOW (ref 150–400)
POTASSIUM SERPL-MCNC: 4.4 MMOL/L — SIGNIFICANT CHANGE UP (ref 3.5–5.3)
POTASSIUM SERPL-SCNC: 4.4 MMOL/L — SIGNIFICANT CHANGE UP (ref 3.5–5.3)
RBC # BLD: 4.66 M/UL — SIGNIFICANT CHANGE UP (ref 3.8–5.2)
RBC # FLD: 13.2 % — SIGNIFICANT CHANGE UP (ref 10.3–14.5)
SODIUM SERPL-SCNC: 140 MMOL/L — SIGNIFICANT CHANGE UP (ref 135–145)
WBC # BLD: 7.82 K/UL — SIGNIFICANT CHANGE UP (ref 3.8–10.5)
WBC # FLD AUTO: 7.82 K/UL — SIGNIFICANT CHANGE UP (ref 3.8–10.5)

## 2023-06-10 PROCEDURE — 99232 SBSQ HOSP IP/OBS MODERATE 35: CPT

## 2023-06-10 RX ORDER — MECLIZINE HCL 12.5 MG
12.5 TABLET ORAL EVERY 8 HOURS
Refills: 0 | Status: DISCONTINUED | OUTPATIENT
Start: 2023-06-10 | End: 2023-06-11

## 2023-06-10 RX ADMIN — Medication 12.5 MILLIGRAM(S): at 05:16

## 2023-06-10 RX ADMIN — MIDODRINE HYDROCHLORIDE 2.5 MILLIGRAM(S): 2.5 TABLET ORAL at 05:17

## 2023-06-10 RX ADMIN — ATORVASTATIN CALCIUM 10 MILLIGRAM(S): 80 TABLET, FILM COATED ORAL at 21:27

## 2023-06-10 RX ADMIN — AMLODIPINE BESYLATE 5 MILLIGRAM(S): 2.5 TABLET ORAL at 05:17

## 2023-06-10 RX ADMIN — APIXABAN 2.5 MILLIGRAM(S): 2.5 TABLET, FILM COATED ORAL at 05:18

## 2023-06-10 RX ADMIN — APIXABAN 2.5 MILLIGRAM(S): 2.5 TABLET, FILM COATED ORAL at 18:14

## 2023-06-10 NOTE — PROGRESS NOTE ADULT - ASSESSMENT
88F with PMH of HTN presenting with vertigo/dizziness and hypertensive, but not within TNK window.  Also noted to be in new atrial fibrillation.  started on anticoagulation and admitted to the stroke service for further workup and medicine consultation.     #Vertigo  #CVA workup  #HTN  #New atrial fibrillation  - continue scheduled meclizine  - eliquis 2.5mg BID  - pending TTE - will happen today, as well as physical therapy  - dose of carvedilol reduced yesterday due to hypotension - improvement in blood pressure with 3.125 BID.  Continue on amlodipine 5mg daily.   - outpatient vestibular therapy    #Polycythemia  #Thrombocytopenia  - ?increased viscosity as cause of symptoms  - still with polycythemia - now with leukocytosis  - no signs or symptoms of infection  - outpatient follow up  - JAK2 pending collection     88F with PMH of HTN presenting with vertigo/dizziness and hypertensive, but not within TNK window.  Also noted to be in new atrial fibrillation.  started on anticoagulation and admitted to the stroke service for further workup and medicine consultation.     #Vertigo  #CVA workup  #HTN  #New atrial fibrillation  - continue scheduled meclizine  - eliquis 2.5mg BID  - outpatient vestibular therapy  - TTE - ECHO with normal LVEF, mild LVF, mild-mod AS, mild AI, mild- mod MR/TR, small pericardial effusion -- appreciate cardiology recs    #orthostatic hypotension  -all BP meds and midodrine on hold  -advise compression stockings  -repeat orthostatics today      #Polycythemia  #Thrombocytopenia  - hg now normalized with resolution of leukocytosis  - outpatient follow up  - JAK2 pending collection    Dispo: home with home PT

## 2023-06-10 NOTE — PROGRESS NOTE ADULT - ASSESSMENT
88y Female with PMHx of HTN (patient report this to be intermittent, not on any medications), maintains a vegan diet presents to the ED with vertigo with room spinning with exacerbation of symptoms with any movement and relief of symptoms with rest. Stroke code called on arrival to the ED. NIHSS 0. Exam significant for right beating nystagmus. Found to be in A fib on EKG, hypertensive with systolic BP in the 200's. Given 5mg IV lopressor with good effect. CTH negative for any acute intracranial pathology. CTA negative for any large vessel occlusion or high grade stenosis. CTP negative for perfusion deficit. Patient out of the window for TNK, not a candidate for mechanical thrombectomy. Although suspicion for stroke is low given relief of symptoms at rest, given new onset Afib, admitted to stroke service for further workup. MRI confirmed no stroke, patient now being managed for orthostatic hypotension.     Neuro  #CVA workup  - continue Eliquis 2.5mg BID  - continue atorvastatin 10mg daily  - q4hr stroke neuro checks and vitals  - MRI brain negative for acute ischemia, etiology of sx likely vertigo  - Stroke Code HCT Results: negative  - Stroke Code CTA Results: negative    #Vertigo  - Meclizine 12.5mg TID standing  - nausea resolved    Cards  - sBP goal gradual normotension, <160 for now  - patient not on any hypertensive meds at home  - ECHO with normal LVEF, mild LVF, mild-mod AS, mild AI, mild- mod MR/TR, small pericardial effusion   - Stroke Code EKG Results: Atrial fibrillation w/ RVR   - c/w Eliquis 2.5mg BID  - cards consult placed for new onset afib and orthostatic hypotension, recs below:  #A fib - rates reasonable at present but needs chronic therapy for rate control  Recommend starting 6.25 of carvedilol bid - patient BP drops when working with PT, so started coreg 3.125mg BID instead then d/c on 6/9 as pressures dropped again. if patient needs rate control can start toprol    #HTN - uncontrolled and getting PRN medications frequently.   Minimize PRN pushes of hydral best optimized w/ PO meds and this may take time to take effect. --> iv anti-hypertensives/hydral may also be a reason for the significant orthostatic hypotensive response -- they act as pure arteriolar dilators.     #Orthostatic hypotension - 166/82 lying () to 158/74 sit (HR 98), 107/57 standing (). Unclear if pt is symptomatic with this.  - Lack of increase in HR may suggest a neurogenic etiology (vs due to the iv pushes of beta blockers) for the OH and recommend consideration of autonomic disorder per neurology team. However, as above would reassess when the pt is not getting iv pushes of hydral and antihypertensives.  Optimization of HTN is still recommended in setting of orthostatic hypotension   Pt appears euvolemic -- no need for IVF  - Started midodrine on 6/9, d/c'd on 6/10 after discussion with Dr. Monet, plan to monitor off amlodipine and midodrine and reintroduce midodrine if necessary      #HLD  - high dose statin as above in CVA  - LDL results: 89    Pulm  - call provider if SPO2 < 94%    GI  #Nutrition/Fluids/Electrolytes   - replete K<4 and Mg <2  - Diet: Vegan    Renal  - Trend BMP    Infectious Disease  - Stroke Code CXR results: No evidence of acute cardiopulmonary disease    Endocrine  - A1C results: 5.8  - TSH results: 1.740    Heme  #polycythemia vera  - f/u RONEY protein   - LFTs outpatient     DVT Prophylaxis  - c/w Eliquis   - SCDs for DVT prophylaxis       IDR Goals: Goals reviewed at interdisciplinary rounds with case management, social work, physical therapy, occupational therapy, and speech language pathology.   Please see specific therapy  notes for in depth goals.    Dispo: home PT/OT pending better BP stability     Discussed daily hospital plans and goals with patient.    Discussed with Dr. Sanchez and Dr. Monet

## 2023-06-10 NOTE — PROGRESS NOTE ADULT - SUBJECTIVE AND OBJECTIVE BOX
Neurology Stroke Progress Note    INTERVAL HPI/OVERNIGHT EVENTS:  Patient seen and examined no acute overnight events, reports not sleeping well but otherwise is feeling okay. No orthostasis reported this morning when OOB.     MEDICATIONS  (STANDING):  apixaban 2.5 milliGRAM(s) Oral every 12 hours  atorvastatin 10 milliGRAM(s) Oral at bedtime  meclizine 12.5 milliGRAM(s) Oral three times a day  polyethylene glycol 3350 17 Gram(s) Oral daily  senna 2 Tablet(s) Oral at bedtime    MEDICATIONS  (PRN):      Allergies    penicillin (Anaphylaxis)    Intolerances        Vital Signs Last 24 Hrs  T(C): 36.4 (10 Leonid 2023 09:33), Max: 37.2 (09 Jun 2023 21:51)  T(F): 97.6 (10 Leonid 2023 09:33), Max: 98.9 (09 Jun 2023 21:51)  HR: 89 (10 Leonid 2023 12:00) (62 - 89)  BP: 150/69 (10 Leonid 2023 12:00) (123/56 - 159/69)  BP(mean): 99 (10 Leonid 2023 12:00) (81 - 106)  RR: 18 (10 Leonid 2023 12:00) (18 - 19)  SpO2: 95% (10 Leonid 2023 12:00) (92% - 97%)    Parameters below as of 10 Leonid 2023 12:00  Patient On (Oxygen Delivery Method): room air        Physical Exam:  General: No acute distress, awake and alert  Cardiovascular: Regular rate and rhythm, no murmurs, rubs, or gallops. No bruits  Pulmonary: Anterior breath sounds clear bilaterally, no crackles or wheezing. No use of accessory muscles  GI: Abdomen soft, non-tender, non-distended    Neurologic:  -Mental status: Awake, alert, oriented to person, place, and time. Speech is fluent with intact naming, repetition, and comprehension, no dysarthria. Recent and remote memory intact. Follows commands. Attention/concentration intact.   -Cranial nerves:   II: Visual fields are full to confrontation.  III, IV, VI: Extraocular movements are intact persistent right beating nystagmus noted. Pupils equally round and reactive to light  V:  Facial sensation V1-V3 equal and intact   VII: symmetric  Motor: Normal bulk and tone. No pronator drift. Strength bilateral upper extremity 5/5, bilateral lower extremities 5/5.  Rapid alternating movements intact and symmetric  Sensation: Intact to light touch bilaterally. No neglect or extinction on double simultaneous testing.  Coordination: No dysmetria on finger-to-nose bilaterally  Gait: Narrow and steady    LABS:                        14.7   7.82  )-----------( 94       ( 10 Leonid 2023 05:30 )             45.1     06-10    140  |  107  |  16  ----------------------------<  88  4.4   |  23  |  0.80    Ca    9.3      10 Leonid 2023 05:30  Phos  4.7     06-10  Mg     2.1     06-10            RADIOLOGY & ADDITIONAL TESTS:

## 2023-06-10 NOTE — PROGRESS NOTE ADULT - ASSESSMENT
Neurology    88 y o Female with PMHx of HTN (patient report this to be intermittent, not on any medications), maintains a vegan diet presents to the ED with vertigo with room spinning with exacerbation of symptoms with any movement and relief of symptoms with rest. Stroke code called on arrival to the ED. NIHSS 0. Exam significant for right beating nystagmus. Found to be in A fib on EKG, hypertensive with systolic BP in the 200's. Given 5mg IV lopressor with good effect. CTH negative for any acute intracranial pathology. CTA negative for any large vessel occlusion or high grade stenosis. CTP negative for perfusion deficit. Patient out of the window for TNK, not a candidate for mechanical thrombectomy. Although suspicion for stroke is low given relief of symptoms at rest, given new onset Afib, admitted to stroke service for further workup. MRI confirmed no stroke, patient now being managed for orthostatic hypotension.     Neuro  #CVA workup  - continue Eliquis 2.5mg BID  - continue atorvastatin 10mg daily  - q4hr stroke neuro checks and vitals  - MRI brain negative for acute ischemia, etiology of sx likely vertigo  - Stroke Code HCT Results: negative  - Stroke Code CTA Results: negative    #Vertigo  - Meclizine 12.5mg TID standing  - nausea resolved    Cards  - sBP goal gradual normotension, <160 for now  - patient not on any hypertensive meds at home  - ECHO with normal LVEF, mild LVF, mild-mod AS, mild AI, mild- mod MR/TR, small pericardial effusion   - Stroke Code EKG Results: Atrial fibrillation w/ RVR   - c/w Eliquis 2.5mg BID  - cards consult placed for new onset afib and orthostatic hypotension, recs below:  #A fib - rates reasonable at present but needs chronic therapy for rate control  Recommend starting 6.25 of carvedilol bid - patient BP drops when working with PT, so started coreg 3.125mg BID instead then d/c on 6/9 as pressures dropped again. if patient needs rate control can start toprol    #HTN - uncontrolled and getting PRN medications frequently.   Minimize PRN pushes of hydral best optimized w/ PO meds and this may take time to take effect. --> iv anti-hypertensives/hydral may also be a reason for the significant orthostatic hypotensive response -- they act as pure arteriolar dilators.     #Orthostatic hypotension - 166/82 lying () to 158/74 sit (HR 98), 107/57 standing (). Unclear if pt is symptomatic with this.  - Lack of increase in HR may suggest a neurogenic etiology (vs due to the iv pushes of beta blockers) for the OH and recommend consideration of autonomic disorder per neurology team. However, as above would reassess when the pt is not getting iv pushes of hydral and antihypertensives.  Optimization of HTN is still recommended in setting of orthostatic hypotension   Pt appears euvolemic -- no need for IVF  - Started midodrine on 6/9, d/c'd on 6/10 after discussion with Dr. Monet, plan to monitor off amlodipine and midodrine and reintroduce midodrine if necessary      #HLD  - high dose statin as above in CVA  - LDL results: 89    Pulm  - call provider if SPO2 < 94%    GI  #Nutrition/Fluids/Electrolytes   - replete K<4 and Mg <2  - Diet: Vegan    Renal  - Trend BMP    Infectious Disease  - Stroke Code CXR results: No evidence of acute cardiopulmonary disease    Endocrine  - A1C results: 5.8  - TSH results: 1.740    Heme  #polycythemia vera  - f/u RONEY protein   - LFTs outpatient     DVT Prophylaxis  - c/w Eliquis   - SCDs for DVT prophylaxis       IDR Goals: Goals reviewed at interdisciplinary rounds with case management, social work, physical therapy, occupational therapy, and speech language pathology.   Please see specific therapy  notes for in depth goals.

## 2023-06-10 NOTE — PROGRESS NOTE ADULT - SUBJECTIVE AND OBJECTIVE BOX
OVERNIGHT EVENTS:    SUBJECTIVE / INTERVAL HPI: Patient seen and examined at bedside.     VITAL SIGNS:  Vital Signs Last 24 Hrs  T(C): 36.4 (10 Leonid 2023 09:33), Max: 37.2 (09 Jun 2023 21:51)  T(F): 97.6 (10 Leonid 2023 09:33), Max: 98.9 (09 Jun 2023 21:51)  HR: 89 (10 Leonid 2023 12:00) (62 - 89)  BP: 150/69 (10 Leonid 2023 12:00) (123/56 - 159/69)  BP(mean): 99 (10 Leonid 2023 12:00) (81 - 106)  RR: 18 (10 Leonid 2023 12:00) (18 - 19)  SpO2: 95% (10 Leonid 2023 12:00) (92% - 97%)    Parameters below as of 10 Leonid 2023 12:00  Patient On (Oxygen Delivery Method): room air        06-09-23 @ 07:01  -  06-10-23 @ 07:00  --------------------------------------------------------  IN: 237 mL / OUT: 800 mL / NET: -563 mL    06-10-23 @ 07:01  -  06-10-23 @ 12:15  --------------------------------------------------------  IN: 200 mL / OUT: 600 mL / NET: -400 mL        PHYSICAL EXAM:    General: WDWN  HEENT: NC/AT; PERRL, anicteric sclera; MMM  Neck: supple  Cardiovascular: +S1/S2; RRR  Respiratory: CTA B/L; no W/R/R  Gastrointestinal: soft, NT/ND; +BSx4  Extremities: WWP; no edema, clubbing or cyanosis  Vascular: 2+ radial, DP/PT pulses B/L  Neurological: AAOx3; no focal deficits    MEDICATIONS:  MEDICATIONS  (STANDING):  apixaban 2.5 milliGRAM(s) Oral every 12 hours  atorvastatin 10 milliGRAM(s) Oral at bedtime  meclizine 12.5 milliGRAM(s) Oral three times a day  polyethylene glycol 3350 17 Gram(s) Oral daily  senna 2 Tablet(s) Oral at bedtime    MEDICATIONS  (PRN):      ALLERGIES:  Allergies    penicillin (Anaphylaxis)    Intolerances        LABS:                        14.7   7.82  )-----------( 94       ( 10 Leonid 2023 05:30 )             45.1     06-10    140  |  107  |  16  ----------------------------<  88  4.4   |  23  |  0.80    Ca    9.3      10 Leonid 2023 05:30  Phos  4.7     06-10  Mg     2.1     06-10          CAPILLARY BLOOD GLUCOSE            RADIOLOGY & ADDITIONAL TESTS: Reviewed.    ASSESSMENT:    PLAN:  OVERNIGHT EVENTS: none    SUBJECTIVE / INTERVAL HPI: Patient seen and examined at bedside. [place] well with OOB    VITAL SIGNS:  Vital Signs Last 24 Hrs  T(C): 36.4 (10 Leonid 2023 09:33), Max: 37.2 (09 Jun 2023 21:51)  T(F): 97.6 (10 Leonid 2023 09:33), Max: 98.9 (09 Jun 2023 21:51)  HR: 89 (10 Leonid 2023 12:00) (62 - 89)  BP: 150/69 (10 Leonid 2023 12:00) (123/56 - 159/69)  BP(mean): 99 (10 Leonid 2023 12:00) (81 - 106)  RR: 18 (10 Leonid 2023 12:00) (18 - 19)  SpO2: 95% (10 Leonid 2023 12:00) (92% - 97%)    Parameters below as of 10 Leonid 2023 12:00  Patient On (Oxygen Delivery Method): room air        06-09-23 @ 07:01  -  06-10-23 @ 07:00  --------------------------------------------------------  IN: 237 mL / OUT: 800 mL / NET: -563 mL    06-10-23 @ 07:01  -  06-10-23 @ 12:15  --------------------------------------------------------  IN: 200 mL / OUT: 600 mL / NET: -400 mL        PHYSICAL EXAM:    General: NAD  HEENT: NC/A  Neck: supple  Cardiovascular: +S1/S2; RRR  Respiratory: CTA B/L; no W/R/R  Gastrointestinal: soft, NT/ND; +BSx4  Extremities: WWP; no edema, clubbing or cyanosis  Vascular: 2+ radial, DP/PT pulses B/L  Neurological: AAOx3; no focal deficits, CN 2-12 intact    MEDICATIONS:  MEDICATIONS  (STANDING):  apixaban 2.5 milliGRAM(s) Oral every 12 hours  atorvastatin 10 milliGRAM(s) Oral at bedtime  meclizine 12.5 milliGRAM(s) Oral three times a day  polyethylene glycol 3350 17 Gram(s) Oral daily  senna 2 Tablet(s) Oral at bedtime    MEDICATIONS  (PRN):      ALLERGIES:  Allergies    penicillin (Anaphylaxis)    Intolerances        LABS:                        14.7   7.82  )-----------( 94       ( 10 Leonid 2023 05:30 )             45.1     06-10    140  |  107  |  16  ----------------------------<  88  4.4   |  23  |  0.80    Ca    9.3      10 Leonid 2023 05:30  Phos  4.7     06-10  Mg     2.1     06-10          CAPILLARY BLOOD GLUCOSE            RADIOLOGY & ADDITIONAL TESTS: Reviewed.    ASSESSMENT:    PLAN:

## 2023-06-10 NOTE — PROGRESS NOTE ADULT - NSPROGADDITIONALINFOA_GEN_ALL_CORE
Vascular Neurology Fellow Attestation:  Patient seen and examined with stroke team and agree with above. 89yo woman presenting with room-spinning dizziness suspect secondary to peripheral vertigo, improved with meclizine. MRI brain negative for acute infarct or other abnormality. Hospital course complicated by diagnosis of new onset afib and orthostatic hypotension.  -now on Eliquis 2.5mg BID for afib based on age >80 and weight <60kg  -Appreciate cardiology recs regarding management of fluctuating blood pressure, avoid PRN IV antihypertensive meds, now on carvedilol and amlodipine  -SBP goal <180  -TTE pending  -Pending PT/OT eval one BP stable, may benefit from vestibular rehab    Discussed with attending, Dr. London
Vascular Neurology Fellow Attestation:  Patient seen and examined with stroke team and agree with above. 89yo woman presenting with room-spinning dizziness suspect secondary to peripheral vertigo, improved with meclizine. MRI brain negative for acute infarct or other abnormality. Hospital course complicated by diagnosis of new onset afib and orthostatic hypotension.  -now on Eliquis 2.5mg BID for afib based on age >80 and weight <60kg  -Appreciate cardiology recs regarding management of fluctuating blood pressure, avoid PRN IV antihypertensive meds, off carvedilol and started midodrine 6/9 per cardiology recs, will d/c midodrine and amlodipine today and monitor off medications for improvement in fluctuating blood pressure  -SBP goal <180  -TTE done, mildly dilated left atrium  -appreciate PT/OT recs    Discussed with attending, Dr. Monet
Vascular Neurology Fellow Attestation:  Patient seen and examined with stroke team and agree with above. 87yo woman presenting with room-spinning dizziness suspect secondary to peripheral vertigo, improved with meclizine. MRI brain negative for acute infarct or other abnormality. Hospital course complicated by diagnosis of new onset afib and orthostatic hypotension.  -now on Eliquis 2.5mg BID for afib based on age >80 and weight <60kg  -Appreciate cardiology recs regarding management of fluctuating blood pressure, avoid PRN IV antihypertensive meds, will d/c carvedilol and start midodrine per cardiology recs, continue amlodipine  -SBP goal <180  -TTE done, mildly dilated left atrium  -appreciate PT/OT recs    Discussed with attending, Dr. London

## 2023-06-10 NOTE — PROGRESS NOTE ADULT - SUBJECTIVE AND OBJECTIVE BOX
Physical Medicine and Rehabilitation Progress Note :       Patient is a 88y old  Female who presents with a chief complaint of dizziness with roomspinning with new onset Afib (10 Leonid 2023 12:35)      HPI:   **STROKE HPI***    HPI: 88y Female with PMHx of HTN (patient report this to be intermittent, not on any medications), maintains a vegan diet presents to the ED with vertigo with room spinning since 6/5 0000. The patient awoke at midnight last night and began to have room spinning and severe dizziness with nausea with any movement. She was afraid to move but called out to her  who couldn't hear her as he was sleeping in another room. She got little sleep but awoke this morning with persistent dizziness with nausea and room spinning with associated nausea and vomiting with any movement or changes in position but was asymptomatic at rest. Stroke code called on arrival to the ED. NIHSS 0. Exam significant for right beating nystagmus. Found to be in A fib on EKG, hypertensive with systolic BP in the 200's. Given 5mg IV lopressor with good effect. CTH negative for any acute intracranial pathology. CTA negative for any large vessel occlusion or high grade stenosis. CTP negative for perfusion deficit. Patient out of the window for TNK, not a candidate for mechanical thrombectomy. Of note, patient has not seen a primary care doctor since prior to the pandemic but has no know atrial fibrillation or cardiac issues.     PAST MEDICAL & SURGICAL HISTORY:      FAMILY HISTORY:      SOCIAL HISTORY:         T(C): 36.4 (06-05-23 @ 13:01), Max: 36.4 (06-05-23 @ 13:01)  HR: 75 (06-05-23 @ 15:06) (75 - 125)  BP: 171/77 (06-05-23 @ 15:06) (171/77 - 213/100)  RR: 18 (06-05-23 @ 15:06) (18 - 18)  SpO2: 97% (06-05-23 @ 15:06) (94% - 97%)    MEDICATION RECONCILIATION   MEDICATIONS  (STANDING):  apixaban 2.5 milliGRAM(s) Oral every 12 hours  atorvastatin 80 milliGRAM(s) Oral at bedtime  sodium chloride 0.9%. 1000 milliLiter(s) (75 mL/Hr) IV Continuous <Continuous>    MEDICATIONS  (PRN):  meclizine 12.5 milliGRAM(s) Oral every 8 hours PRN Dizziness  ondansetron Injectable 4 milliGRAM(s) IV Push every 6 hours PRN Nausea and/or Vomiting    Allergies    penicillin (Anaphylaxis)    Intolerances      Vital Signs Last 24 Hrs  T(C): 36.4 (05 Jun 2023 13:01), Max: 36.4 (05 Jun 2023 13:01)  T(F): 97.5 (05 Jun 2023 13:01), Max: 97.5 (05 Jun 2023 13:01)  HR: 75 (05 Jun 2023 15:06) (75 - 125)  BP: 171/77 (05 Jun 2023 15:06) (171/77 - 213/100)  BP(mean): --  RR: 18 (05 Jun 2023 15:06) (18 - 18)  SpO2: 97% (05 Jun 2023 15:06) (94% - 97%)    Parameters below as of 05 Jun 2023 15:06  Patient On (Oxygen Delivery Method): room air           (05 Jun 2023 17:02)                            14.7   7.82  )-----------( 94       ( 10 Leonid 2023 05:30 )             45.1       06-10    140  |  107  |  16  ----------------------------<  88  4.4   |  23  |  0.80    Ca    9.3      10 Leonid 2023 05:30  Phos  4.7     06-10  Mg     2.1     06-10      Vital Signs Last 24 Hrs  T(C): 36.4 (10 Leonid 2023 09:33), Max: 37.2 (09 Jun 2023 21:51)  T(F): 97.6 (10 Leonid 2023 09:33), Max: 98.9 (09 Jun 2023 21:51)  HR: 89 (10 Leonid 2023 12:00) (62 - 89)  BP: 150/69 (10 Leonid 2023 12:00) (123/56 - 159/69)  BP(mean): 99 (10 Leonid 2023 12:00) (81 - 100)  RR: 18 (10 Leonid 2023 12:00) (18 - 19)  SpO2: 95% (10 Leonid 2023 12:00) (92% - 97%)    Parameters below as of 10 Leonid 2023 12:00  Patient On (Oxygen Delivery Method): room air        MEDICATIONS  (STANDING):  apixaban 2.5 milliGRAM(s) Oral every 12 hours  atorvastatin 10 milliGRAM(s) Oral at bedtime  meclizine 12.5 milliGRAM(s) Oral three times a day  polyethylene glycol 3350 17 Gram(s) Oral daily  senna 2 Tablet(s) Oral at bedtime    MEDICATIONS  (PRN):       6/9/2023  Functional Status Assessment :       Pain Assessment/Number Scale (0-10) Adult  Presence of Pain: denies pain/discomfort (Rating = 0)  Pain Rating (0-10): Rest: 0 (no pain/absence of nonverbal indicators of pain)  Pain Rating (0-10): Activity: 0 (no pain/absence of nonverbal indicators of pain)    Safety      AM-[facility] Functional Assessment: Basic Mobility  Type of Assessment: Daily assessment  Turning from your back to your side while in a flat bed without using bedrails?: 3 = A little assistance  Moving from lying on your back to sitting on the flat side of a flat bed without using bedrails?: 3 = A little assistance  Moving to and from a bed to a chair (including a wheelchair)?: 3 = A little assistance  Standing up from a chair using your arms (e.g. wheelchair or bedside chair)?: 3 = A little assistance  Walking in hospital room?: 3 = A little assistance  Climbing 3-5 steps with a railing?: 3-calculated by average   Score: 18   Row Comment: Ask the patient "How much help from another person do you currently need? (If the patient hasn't done an activity recently, how much help from another person do you think he/she needs if he/she tried?)    Cognitive/Neuro      Cognitive/Neuro/Behavioral  Cognitive/Neuro/Behavioral [WDL Definition: Alert; opens eyes spontaneously; arouses to voice or touch; oriented x 4; follows commands; speech spontaneous, logical; purposeful motor response; behavior appropriate to situation]: WDL    Language Assistance  Preferred Language to Address Healthcare Preferred Language to Address Healthcare: English    Therapeutic Interventions      Sit-Stand Transfer Training  Transfer Training Sit-to-Stand Transfer: contact guard;  verbal cues;  1 person assist;  full weight-bearing   rolling walker  Transfer Training Stand-to-Sit Transfer: contact guard;  verbal cues;  1 person assist;  full weight-bearing   rolling walker    Gait Training  Gait Training: contact guard;  verbal cues;  1 person assist;  full weight-bearing   rolling walker;  15 feet  Gait Analysis: 2-point gait   decreased diana;  15 feet;  rolling walker  Gait Number of Times:: x 2      AM-[facility] Functional Assessment: Daily Activity  Type of Assessment: Daily assessment  Putting on and taking off regular lower body clothing?: 3 = A little assistance  Bathing (including washing, rinsing, drying)?: 3 = A little assistance  Toileting, which includes using toilet, bedpan or urinal?: 3 = A little assistance  Putting on and taking off regular upper body clothing?: 3 = A little assistance  Take care of personal grooming such as brushing teeth?: 4 = No assist / stand by assistance  Eating meals?: 4 = No assist / stand by assistance  Score: 20   Row Comment: Ask the patient "How much help from another person do you currently need? (If the patient hasn't done an activity recently, how much help from another person do you think he/she needs if he/she tried?)    Cognitive/Neuro      Cognitive/Neuro/Behavioral  Cognitive/Neuro/Behavioral [WDL Definition: Alert; opens eyes spontaneously; arouses to voice or touch; oriented x 4; follows commands; speech spontaneous, logical; purposeful motor response; behavior appropriate to situation]: WDL    Language Assistance  Preferred Language to Address Healthcare Preferred Language to Address Healthcare: English    Therapeutic Interventions      Sit-Stand Transfer Training  Transfer Training Sit-to-Stand Transfer: contact guard;  1 person assist;  nonverbal cues (demo/gestures);  verbal cues;  rolling walker  Transfer Training Stand-to-Sit Transfer: contact guard;  1 person assist;  nonverbal cues (demo/gestures);  verbal cues;  rolling walker  Sit-to-Stand Transfer Training Transfer Safety Analysis: decreased balance;  dizziness    Therapeutic Exercise  Therapeutic Exercise Detail: While seated pt engaged in BUE shoulder flex/ext and elbow flex/ext x10 reps. Performed standing marching in place with RW and CGA x10 reps, no LOB noted. Functional mobility training performed with pt able to ambulate ~10'x2 with CGA using RW, further distance deferred 2/2 lightheadedness +orthostatic     Upper Body Dressing Training  Upper Body Dressing Training Assistance: contact guard;  1 person assist;  nonverbal cues (demo/gestures);  verbal cues;  don gown while seated in chair;  decreased flexibility;  decreased strength    Grooming Training  Grooming Training Assistance: independent;  brushing hair while seated              PM&R Impression : as above    Current Disposition Plan Recommendations :       d/c home , home physical / occupational therapy

## 2023-06-11 ENCOUNTER — TRANSCRIPTION ENCOUNTER (OUTPATIENT)
Age: 88
End: 2023-06-11

## 2023-06-11 VITALS
RESPIRATION RATE: 17 BRPM | DIASTOLIC BLOOD PRESSURE: 70 MMHG | HEART RATE: 92 BPM | OXYGEN SATURATION: 96 % | SYSTOLIC BLOOD PRESSURE: 160 MMHG

## 2023-06-11 PROCEDURE — 93306 TTE W/DOPPLER COMPLETE: CPT

## 2023-06-11 PROCEDURE — 97165 OT EVAL LOW COMPLEX 30 MIN: CPT

## 2023-06-11 PROCEDURE — 97530 THERAPEUTIC ACTIVITIES: CPT

## 2023-06-11 PROCEDURE — 36415 COLL VENOUS BLD VENIPUNCTURE: CPT

## 2023-06-11 PROCEDURE — 97110 THERAPEUTIC EXERCISES: CPT

## 2023-06-11 PROCEDURE — 82607 VITAMIN B-12: CPT

## 2023-06-11 PROCEDURE — 70496 CT ANGIOGRAPHY HEAD: CPT | Mod: MC

## 2023-06-11 PROCEDURE — 97116 GAIT TRAINING THERAPY: CPT

## 2023-06-11 PROCEDURE — 81001 URINALYSIS AUTO W/SCOPE: CPT

## 2023-06-11 PROCEDURE — 82962 GLUCOSE BLOOD TEST: CPT

## 2023-06-11 PROCEDURE — 80053 COMPREHEN METABOLIC PANEL: CPT

## 2023-06-11 PROCEDURE — 80061 LIPID PANEL: CPT

## 2023-06-11 PROCEDURE — 70450 CT HEAD/BRAIN W/O DYE: CPT | Mod: MC

## 2023-06-11 PROCEDURE — 85610 PROTHROMBIN TIME: CPT

## 2023-06-11 PROCEDURE — 70498 CT ANGIOGRAPHY NECK: CPT | Mod: MC

## 2023-06-11 PROCEDURE — 70551 MRI BRAIN STEM W/O DYE: CPT

## 2023-06-11 PROCEDURE — 80048 BASIC METABOLIC PNL TOTAL CA: CPT

## 2023-06-11 PROCEDURE — 99285 EMERGENCY DEPT VISIT HI MDM: CPT

## 2023-06-11 PROCEDURE — 85027 COMPLETE CBC AUTOMATED: CPT

## 2023-06-11 PROCEDURE — 99232 SBSQ HOSP IP/OBS MODERATE 35: CPT

## 2023-06-11 PROCEDURE — 71045 X-RAY EXAM CHEST 1 VIEW: CPT

## 2023-06-11 PROCEDURE — 0042T: CPT

## 2023-06-11 PROCEDURE — 83036 HEMOGLOBIN GLYCOSYLATED A1C: CPT

## 2023-06-11 PROCEDURE — 85025 COMPLETE CBC W/AUTO DIFF WBC: CPT

## 2023-06-11 PROCEDURE — 83735 ASSAY OF MAGNESIUM: CPT

## 2023-06-11 PROCEDURE — 84100 ASSAY OF PHOSPHORUS: CPT

## 2023-06-11 PROCEDURE — 84443 ASSAY THYROID STIM HORMONE: CPT

## 2023-06-11 PROCEDURE — 97161 PT EVAL LOW COMPLEX 20 MIN: CPT

## 2023-06-11 PROCEDURE — 85730 THROMBOPLASTIN TIME PARTIAL: CPT

## 2023-06-11 RX ORDER — ATORVASTATIN CALCIUM 80 MG/1
1 TABLET, FILM COATED ORAL
Qty: 30 | Refills: 0
Start: 2023-06-11 | End: 2023-07-10

## 2023-06-11 RX ORDER — APIXABAN 2.5 MG/1
1 TABLET, FILM COATED ORAL
Qty: 60 | Refills: 0
Start: 2023-06-11 | End: 2023-07-10

## 2023-06-11 RX ORDER — MECLIZINE HCL 12.5 MG
1 TABLET ORAL
Qty: 30 | Refills: 0
Start: 2023-06-11

## 2023-06-11 RX ADMIN — APIXABAN 2.5 MILLIGRAM(S): 2.5 TABLET, FILM COATED ORAL at 17:31

## 2023-06-11 RX ADMIN — APIXABAN 2.5 MILLIGRAM(S): 2.5 TABLET, FILM COATED ORAL at 05:45

## 2023-06-11 NOTE — PROVIDER CONTACT NOTE (CHANGE IN STATUS NOTIFICATION) - BACKGROUND
88y Female with PMHx of HTN (patient report this to be intermittent, not on any medications), maintains a vegan diet presents to the ED with vertigo with room spinning with exacerbation of symptoms with any movement and relief of symptoms with rest.
88y Female with PMHx of HTN (patient report this to be intermittent, not on any medications), maintains a vegan diet presents to the ED with vertigo with room spinning with exacerbation of symptoms with any movement and relief of symptoms with rest. New dx afib
Pt newly dx afib. admit w/ vertigo symptoms.
88y Female with PMHx of HTN (patient report this to be intermittent, not on any medications), maintains a vegan diet presents to the ED with vertigo with room spinning since last night with exacerbation of symptoms with any movement and relief of symptoms with rest. Newly dx afib.
88y Female with PMHx of HTN (patient report this to be intermittent, not on any medications), maintains a vegan diet presents to the ED with vertigo with room spinning with exacerbation of symptoms with any movement and relief of symptoms with rest.
88y Female with PMHx of HTN (patient report this to be intermittent, not on any medications), maintains a vegan diet presents to the ED with vertigo with room spinning since last night with exacerbation of symptoms with any movement and relief of symptoms with rest. Newly dx afib

## 2023-06-11 NOTE — PROGRESS NOTE ADULT - PROVIDER SPECIALTY LIST ADULT
Cardiology
Neurology
Rehab Medicine
Rehab Medicine
Hospitalist
Neurology
Neurology
Hospitalist
Hospitalist

## 2023-06-11 NOTE — PROGRESS NOTE ADULT - ASSESSMENT
88F with PMH of HTN presenting with vertigo/dizziness and hypertensive, but not within TNK window.  Also noted to be in new atrial fibrillation.  started on anticoagulation and admitted to the stroke service for further workup and medicine consultation.     #Vertigo  #CVA workup  #HTN  #New atrial fibrillation  - continue scheduled meclizine  - eliquis 2.5mg BID  - outpatient vestibular therapy  - TTE - ECHO with normal LVEF, mild LVF, mild-mod AS, mild AI, mild- mod MR/TR, small pericardial effusion -- appreciate cardiology recs    #orthostatic hypotension  -all BP meds and midodrine on hold  -advise compression stockings  -repeat orthostatics today      #Polycythemia  #Thrombocytopenia  - hg now normalized with resolution of leukocytosis  - outpatient follow up  - JAK2 pending collection    Dispo: home with home PT 88F with PMH of HTN presenting with vertigo/dizziness and hypertensive, but not within TNK window.  Also noted to be in new atrial fibrillation.  started on anticoagulation and admitted to the stroke service for further workup and medicine consultation.     #Vertigo  #CVA workup  #HTN  #New atrial fibrillation  - prn meclizine  - eliquis 2.5mg BID  - outpatient vestibular therapy  - TTE - ECHO with normal LVEF, mild LVF, mild-mod AS, mild AI, mild- mod MR/TR, small pericardial effusion -- appreciate cardiology recs    #orthostatic hypotension  -all BP meds and midodrine on hold  -advise compression stockings  -repeat orthostatics today  -given hx of labile BP would recommend no initiation at this time of anti hypertensive -- would recommend outpatient followup for 24 hour BP monitoring, suspect component of white coat HTN      #polycythemia  #Thrombocytopenia  - hg now normalized with resolution of leukocytosis  - outpatient follow up for thrombocytopenia eval    Dispo: home with home PT

## 2023-06-11 NOTE — PROVIDER CONTACT NOTE (CHANGE IN STATUS NOTIFICATION) - ASSESSMENT
Neuro checks unchanged. Denies pain & discomfort. NSR Neuro checks unchanged. Denies pain & discomfort.

## 2023-06-11 NOTE — PROGRESS NOTE ADULT - SUBJECTIVE AND OBJECTIVE BOX
OVERNIGHT EVENTS:    SUBJECTIVE / INTERVAL HPI: Patient seen and examined at bedside.     VITAL SIGNS:  Vital Signs Last 24 Hrs  T(C): 36.8 (11 Jun 2023 04:00), Max: 37.1 (10 Leonid 2023 17:30)  T(F): 98.3 (11 Jun 2023 04:00), Max: 98.7 (10 Leonid 2023 17:30)  HR: 96 (11 Jun 2023 09:25) (70 - 96)  BP: 183/86 (11 Jun 2023 09:25) (114/82 - 185/102)  BP(mean): 124 (11 Jun 2023 09:25) (90 - 134)  RR: 19 (11 Jun 2023 09:25) (17 - 20)  SpO2: 96% (11 Jun 2023 09:25) (95% - 99%)    Parameters below as of 11 Jun 2023 09:25  Patient On (Oxygen Delivery Method): room air        06-10-23 @ 07:01  -  06-11-23 @ 07:00  --------------------------------------------------------  IN: 825 mL / OUT: 1500 mL / NET: -675 mL    06-11-23 @ 07:01  -  06-11-23 @ 09:35  --------------------------------------------------------  IN: 0 mL / OUT: 950 mL / NET: -950 mL        PHYSICAL EXAM:    General: WDWN  HEENT: NC/AT; PERRL, anicteric sclera; MMM  Neck: supple  Cardiovascular: +S1/S2; RRR  Respiratory: CTA B/L; no W/R/R  Gastrointestinal: soft, NT/ND; +BSx4  Extremities: WWP; no edema, clubbing or cyanosis  Vascular: 2+ radial, DP/PT pulses B/L  Neurological: AAOx3; no focal deficits    MEDICATIONS:  MEDICATIONS  (STANDING):  apixaban 2.5 milliGRAM(s) Oral every 12 hours  atorvastatin 10 milliGRAM(s) Oral at bedtime  polyethylene glycol 3350 17 Gram(s) Oral daily  senna 2 Tablet(s) Oral at bedtime    MEDICATIONS  (PRN):  meclizine 12.5 milliGRAM(s) Oral every 8 hours PRN Dizziness      ALLERGIES:  Allergies    penicillin (Anaphylaxis)    Intolerances        LABS:                        14.7   7.82  )-----------( 94       ( 10 Leonid 2023 05:30 )             45.1     06-10    140  |  107  |  16  ----------------------------<  88  4.4   |  23  |  0.80    Ca    9.3      10 Leonid 2023 05:30  Phos  4.7     06-10  Mg     2.1     06-10          CAPILLARY BLOOD GLUCOSE            RADIOLOGY & ADDITIONAL TESTS: Reviewed.    ASSESSMENT:    PLAN:  OVERNIGHT EVENTS: none    SUBJECTIVE / INTERVAL HPI: Patient seen and examined at bedside. Feels frustrated because she finally fell into a deep sleep was awakened by nurse. Transferred from bed to chair without issue. No dizziness/lightheadedness except when moving head vertically. Has not stood up/ walked around yet. Further reports that since menopause her BP has always been labile and especially shoots up when she is anxious.     VITAL SIGNS:  Vital Signs Last 24 Hrs  T(C): 36.8 (11 Jun 2023 04:00), Max: 37.1 (10 Leonid 2023 17:30)  T(F): 98.3 (11 Jun 2023 04:00), Max: 98.7 (10 Leonid 2023 17:30)  HR: 96 (11 Jun 2023 09:25) (70 - 96)  BP: 183/86 (11 Jun 2023 09:25) (114/82 - 185/102)  BP(mean): 124 (11 Jun 2023 09:25) (90 - 134)  RR: 19 (11 Jun 2023 09:25) (17 - 20)  SpO2: 96% (11 Jun 2023 09:25) (95% - 99%)    Parameters below as of 11 Jun 2023 09:25  Patient On (Oxygen Delivery Method): room air        06-10-23 @ 07:01  -  06-11-23 @ 07:00  --------------------------------------------------------  IN: 825 mL / OUT: 1500 mL / NET: -675 mL    06-11-23 @ 07:01  -  06-11-23 @ 09:35  --------------------------------------------------------  IN: 0 mL / OUT: 950 mL / NET: -950 mL        PHYSICAL EXAM:    General: Sitting in bedside chair comfortably, appears younger than stated age  HEENT: NC/AT  Neck: supple  Cardiovascular: +S1/S2; RRR  Respiratory: CTA B/L; no W/R/R  Gastrointestinal: soft, NT/ND; +BSx4  Extremities: WWP; no edema, clubbing or cyanosis  Vascular: 2+ radial, DP/PT pulses B/L  Neurological: AAOx3; no focal deficits    MEDICATIONS:  MEDICATIONS  (STANDING):  apixaban 2.5 milliGRAM(s) Oral every 12 hours  atorvastatin 10 milliGRAM(s) Oral at bedtime  polyethylene glycol 3350 17 Gram(s) Oral daily  senna 2 Tablet(s) Oral at bedtime    MEDICATIONS  (PRN):  meclizine 12.5 milliGRAM(s) Oral every 8 hours PRN Dizziness      ALLERGIES:  Allergies    penicillin (Anaphylaxis)    Intolerances        LABS:                        14.7   7.82  )-----------( 94       ( 10 Leonid 2023 05:30 )             45.1     06-10    140  |  107  |  16  ----------------------------<  88  4.4   |  23  |  0.80    Ca    9.3      10 Leonid 2023 05:30  Phos  4.7     06-10  Mg     2.1     06-10          CAPILLARY BLOOD GLUCOSE            RADIOLOGY & ADDITIONAL TESTS: Reviewed.    ASSESSMENT:    PLAN:

## 2023-06-11 NOTE — PROVIDER CONTACT NOTE (CHANGE IN STATUS NOTIFICATION) - ASSESSMENT
Pt c/o anxiety. Neuro checks unchanged. Denies pain & discomfort. NSR. Pt c/o anxiety. Neuro checks unchanged. Denies pain & discomfort.

## 2023-06-11 NOTE — PROGRESS NOTE ADULT - REASON FOR ADMISSION
dizziness with room spinning with new onset Afib
dizziness with roomspinning with new onset Afib
dizziness with room spinning with new onset Afib
dizziness with roomspinning with new onset Afib
dizziness with room spinning with new onset Afib
dizziness with roomspinning with new onset Afib

## 2023-06-11 NOTE — DISCHARGE NOTE NURSING/CASE MANAGEMENT/SOCIAL WORK - NSDCPEFALRISK_GEN_ALL_CORE
For information on Fall & Injury Prevention, visit: [email or web address] OR  [email or web address] OR  https://www.cdc.gov/steadi/patient.html

## 2023-06-11 NOTE — DISCHARGE NOTE NURSING/CASE MANAGEMENT/SOCIAL WORK - PATIENT PORTAL LINK FT
You can access the FollowMyHealth Patient Portal offered by [facility] by registering at the following website: [email or web address]. By joining [facility]’s FollowMyHealth portal, you will also be able to view your health information using other applications (apps) compatible with our system.

## 2023-06-11 NOTE — PROVIDER CONTACT NOTE (CHANGE IN STATUS NOTIFICATION) - SITUATION
HR=87, FV=620/102, Pt.'s BP out of systolic goal of 100-160
Blood pressure above parameter
Elevated BP out of parameter
HR elevated
HR=96, ca=713/86. Pt.'s BP out of systolic goal of 100-160
Pt HR sustaining in 115-120s range. Pt BP meeting goal but still elevated at 198/104. Pt experiencing some anxiety related to hospital stay.

## 2023-06-12 RX ORDER — ATORVASTATIN CALCIUM 80 MG/1
1 TABLET, FILM COATED ORAL
Qty: 30 | Refills: 0
Start: 2023-06-12 | End: 2023-07-11

## 2023-06-12 RX ORDER — APIXABAN 2.5 MG/1
1 TABLET, FILM COATED ORAL
Qty: 60 | Refills: 0
Start: 2023-06-12 | End: 2023-07-11

## 2023-06-12 RX ORDER — MECLIZINE HCL 12.5 MG
1 TABLET ORAL
Qty: 30 | Refills: 0
Start: 2023-06-12

## 2023-06-16 DIAGNOSIS — H81.399 OTHER PERIPHERAL VERTIGO, UNSPECIFIED EAR: ICD-10-CM

## 2023-06-16 DIAGNOSIS — Z88.0 ALLERGY STATUS TO PENICILLIN: ICD-10-CM

## 2023-06-16 DIAGNOSIS — I48.91 UNSPECIFIED ATRIAL FIBRILLATION: ICD-10-CM

## 2023-06-16 DIAGNOSIS — D45 POLYCYTHEMIA VERA: ICD-10-CM

## 2023-06-16 DIAGNOSIS — I95.1 ORTHOSTATIC HYPOTENSION: ICD-10-CM

## 2023-06-16 DIAGNOSIS — I10 ESSENTIAL (PRIMARY) HYPERTENSION: ICD-10-CM

## 2023-06-16 DIAGNOSIS — R42 DIZZINESS AND GIDDINESS: ICD-10-CM

## 2023-06-16 DIAGNOSIS — D69.6 THROMBOCYTOPENIA, UNSPECIFIED: ICD-10-CM

## 2023-06-29 ENCOUNTER — APPOINTMENT (OUTPATIENT)
Dept: NEUROLOGY | Facility: CLINIC | Age: 88
End: 2023-06-29

## 2023-07-14 ENCOUNTER — EMERGENCY (EMERGENCY)
Facility: HOSPITAL | Age: 88
LOS: 1 days | Discharge: HOME | End: 2023-07-14
Attending: STUDENT IN AN ORGANIZED HEALTH CARE EDUCATION/TRAINING PROGRAM | Admitting: STUDENT IN AN ORGANIZED HEALTH CARE EDUCATION/TRAINING PROGRAM
Payer: MEDICARE

## 2023-07-14 ENCOUNTER — APPOINTMENT (OUTPATIENT)
Dept: NEUROLOGY | Facility: CLINIC | Age: 88
End: 2023-07-14
Payer: MEDICARE

## 2023-07-14 ENCOUNTER — APPOINTMENT (OUTPATIENT)
Dept: INTERNAL MEDICINE | Facility: CLINIC | Age: 88
End: 2023-07-14
Payer: MEDICARE

## 2023-07-14 VITALS
DIASTOLIC BLOOD PRESSURE: 66 MMHG | HEIGHT: 62 IN | WEIGHT: 130.07 LBS | SYSTOLIC BLOOD PRESSURE: 160 MMHG | OXYGEN SATURATION: 95 % | RESPIRATION RATE: 20 BRPM | HEART RATE: 95 BPM | TEMPERATURE: 100 F

## 2023-07-14 VITALS
HEIGHT: 60 IN | OXYGEN SATURATION: 95 % | HEART RATE: 104 BPM | SYSTOLIC BLOOD PRESSURE: 144 MMHG | DIASTOLIC BLOOD PRESSURE: 68 MMHG | TEMPERATURE: 99.6 F

## 2023-07-14 VITALS
WEIGHT: 130 LBS | TEMPERATURE: 97.2 F | DIASTOLIC BLOOD PRESSURE: 84 MMHG | HEIGHT: 60 IN | HEART RATE: 75 BPM | BODY MASS INDEX: 25.52 KG/M2 | OXYGEN SATURATION: 94 % | SYSTOLIC BLOOD PRESSURE: 163 MMHG

## 2023-07-14 VITALS
OXYGEN SATURATION: 96 % | SYSTOLIC BLOOD PRESSURE: 151 MMHG | HEART RATE: 91 BPM | DIASTOLIC BLOOD PRESSURE: 76 MMHG | RESPIRATION RATE: 18 BRPM | TEMPERATURE: 98 F

## 2023-07-14 DIAGNOSIS — R42 DIZZINESS AND GIDDINESS: ICD-10-CM

## 2023-07-14 DIAGNOSIS — R55 SYNCOPE AND COLLAPSE: ICD-10-CM

## 2023-07-14 DIAGNOSIS — R53.1 WEAKNESS: ICD-10-CM

## 2023-07-14 DIAGNOSIS — Z79.01 LONG TERM (CURRENT) USE OF ANTICOAGULANTS: ICD-10-CM

## 2023-07-14 DIAGNOSIS — E78.5 HYPERLIPIDEMIA, UNSPECIFIED: ICD-10-CM

## 2023-07-14 DIAGNOSIS — U07.1 COVID-19: ICD-10-CM

## 2023-07-14 DIAGNOSIS — I10 ESSENTIAL (PRIMARY) HYPERTENSION: ICD-10-CM

## 2023-07-14 DIAGNOSIS — I48.91 UNSPECIFIED ATRIAL FIBRILLATION: ICD-10-CM

## 2023-07-14 DIAGNOSIS — R51.9 HEADACHE, UNSPECIFIED: ICD-10-CM

## 2023-07-14 DIAGNOSIS — Z88.0 ALLERGY STATUS TO PENICILLIN: ICD-10-CM

## 2023-07-14 DIAGNOSIS — R31.29 OTHER MICROSCOPIC HEMATURIA: ICD-10-CM

## 2023-07-14 LAB
ALBUMIN SERPL ELPH-MCNC: 4.4 G/DL — SIGNIFICANT CHANGE UP (ref 3.3–5)
ALP SERPL-CCNC: 95 U/L — SIGNIFICANT CHANGE UP (ref 40–120)
ALT FLD-CCNC: 23 U/L — SIGNIFICANT CHANGE UP (ref 10–45)
ANION GAP SERPL CALC-SCNC: 13 MMOL/L — SIGNIFICANT CHANGE UP (ref 5–17)
ANISOCYTOSIS BLD QL: SLIGHT — SIGNIFICANT CHANGE UP
APPEARANCE UR: CLEAR — SIGNIFICANT CHANGE UP
APTT BLD: 37 SEC — HIGH (ref 27.5–35.5)
AST SERPL-CCNC: 44 U/L — HIGH (ref 10–40)
BACTERIA # UR AUTO: PRESENT /HPF
BASOPHILS # BLD AUTO: 0 K/UL — SIGNIFICANT CHANGE UP (ref 0–0.2)
BASOPHILS NFR BLD AUTO: 0 % — SIGNIFICANT CHANGE UP (ref 0–2)
BILIRUB SERPL-MCNC: 0.8 MG/DL — SIGNIFICANT CHANGE UP (ref 0.2–1.2)
BILIRUB UR-MCNC: NEGATIVE — SIGNIFICANT CHANGE UP
BUN SERPL-MCNC: 10 MG/DL — SIGNIFICANT CHANGE UP (ref 7–23)
BURR CELLS BLD QL SMEAR: SLIGHT — SIGNIFICANT CHANGE UP
CALCIUM SERPL-MCNC: 9.2 MG/DL — SIGNIFICANT CHANGE UP (ref 8.4–10.5)
CHLORIDE SERPL-SCNC: 98 MMOL/L — SIGNIFICANT CHANGE UP (ref 96–108)
CO2 SERPL-SCNC: 25 MMOL/L — SIGNIFICANT CHANGE UP (ref 22–31)
COLOR SPEC: YELLOW — SIGNIFICANT CHANGE UP
CREAT SERPL-MCNC: 0.85 MG/DL — SIGNIFICANT CHANGE UP (ref 0.5–1.3)
DACRYOCYTES BLD QL SMEAR: SLIGHT — SIGNIFICANT CHANGE UP
DIFF PNL FLD: ABNORMAL
EGFR: 67 ML/MIN/1.73M2 — SIGNIFICANT CHANGE UP
EOSINOPHIL # BLD AUTO: 0 K/UL — SIGNIFICANT CHANGE UP (ref 0–0.5)
EOSINOPHIL NFR BLD AUTO: 0 % — SIGNIFICANT CHANGE UP (ref 0–6)
EPI CELLS # UR: SIGNIFICANT CHANGE UP /HPF (ref 0–5)
GIANT PLATELETS BLD QL SMEAR: PRESENT — SIGNIFICANT CHANGE UP
GLUCOSE SERPL-MCNC: 108 MG/DL — HIGH (ref 70–99)
GLUCOSE UR QL: NEGATIVE — SIGNIFICANT CHANGE UP
HCT VFR BLD CALC: 46.2 % — HIGH (ref 34.5–45)
HGB BLD-MCNC: 15.3 G/DL — SIGNIFICANT CHANGE UP (ref 11.5–15.5)
INR BLD: 1.38 — HIGH (ref 0.88–1.16)
KETONES UR-MCNC: NEGATIVE — SIGNIFICANT CHANGE UP
LEUKOCYTE ESTERASE UR-ACNC: ABNORMAL
LYMPHOCYTES # BLD AUTO: 0.26 K/UL — LOW (ref 1–3.3)
LYMPHOCYTES # BLD AUTO: 7.3 % — LOW (ref 13–44)
MANUAL SMEAR VERIFICATION: SIGNIFICANT CHANGE UP
MCHC RBC-ENTMCNC: 31.7 PG — SIGNIFICANT CHANGE UP (ref 27–34)
MCHC RBC-ENTMCNC: 33.1 GM/DL — SIGNIFICANT CHANGE UP (ref 32–36)
MCV RBC AUTO: 95.7 FL — SIGNIFICANT CHANGE UP (ref 80–100)
MONOCYTES # BLD AUTO: 1.84 K/UL — HIGH (ref 0–0.9)
MONOCYTES NFR BLD AUTO: 51.8 % — HIGH (ref 2–14)
NEUTROPHILS # BLD AUTO: 1.42 K/UL — LOW (ref 1.8–7.4)
NEUTROPHILS NFR BLD AUTO: 39.1 % — LOW (ref 43–77)
NEUTS BAND # BLD: 0.9 % — SIGNIFICANT CHANGE UP (ref 0–8)
NITRITE UR-MCNC: NEGATIVE — SIGNIFICANT CHANGE UP
PH UR: 6 — SIGNIFICANT CHANGE UP (ref 5–8)
PLAT MORPH BLD: NORMAL — SIGNIFICANT CHANGE UP
PLATELET # BLD AUTO: 53 K/UL — LOW (ref 150–400)
POLYCHROMASIA BLD QL SMEAR: SLIGHT — SIGNIFICANT CHANGE UP
POTASSIUM SERPL-MCNC: 4.2 MMOL/L — SIGNIFICANT CHANGE UP (ref 3.5–5.3)
POTASSIUM SERPL-SCNC: 4.2 MMOL/L — SIGNIFICANT CHANGE UP (ref 3.5–5.3)
PROT SERPL-MCNC: 7.7 G/DL — SIGNIFICANT CHANGE UP (ref 6–8.3)
PROT UR-MCNC: NEGATIVE MG/DL — SIGNIFICANT CHANGE UP
PROTHROM AB SERPL-ACNC: 16.5 SEC — HIGH (ref 10.5–13.4)
RAPID RVP RESULT: DETECTED
RBC # BLD: 4.83 M/UL — SIGNIFICANT CHANGE UP (ref 3.8–5.2)
RBC # FLD: 13.5 % — SIGNIFICANT CHANGE UP (ref 10.3–14.5)
RBC BLD AUTO: ABNORMAL
RBC CASTS # UR COMP ASSIST: ABNORMAL /HPF
SARS-COV-2 RNA SPEC QL NAA+PROBE: DETECTED
SMUDGE CELLS # BLD: PRESENT — SIGNIFICANT CHANGE UP
SODIUM SERPL-SCNC: 136 MMOL/L — SIGNIFICANT CHANGE UP (ref 135–145)
SP GR SPEC: <=1.005 — SIGNIFICANT CHANGE UP (ref 1–1.03)
TROPONIN T, HIGH SENSITIVITY RESULT: 8 NG/L — SIGNIFICANT CHANGE UP (ref 0–51)
UROBILINOGEN FLD QL: 0.2 E.U./DL — SIGNIFICANT CHANGE UP
VARIANT LYMPHS # BLD: 0.9 % — SIGNIFICANT CHANGE UP (ref 0–6)
WBC # BLD: 3.55 K/UL — LOW (ref 3.8–10.5)
WBC # FLD AUTO: 3.55 K/UL — LOW (ref 3.8–10.5)
WBC UR QL: < 5 /HPF — SIGNIFICANT CHANGE UP

## 2023-07-14 PROCEDURE — 99285 EMERGENCY DEPT VISIT HI MDM: CPT | Mod: 25

## 2023-07-14 PROCEDURE — G2212 PROLONG OUTPT/OFFICE VIS: CPT

## 2023-07-14 PROCEDURE — 99215 OFFICE O/P EST HI 40 MIN: CPT | Mod: 25

## 2023-07-14 PROCEDURE — 70498 CT ANGIOGRAPHY NECK: CPT | Mod: MA

## 2023-07-14 PROCEDURE — 85730 THROMBOPLASTIN TIME PARTIAL: CPT

## 2023-07-14 PROCEDURE — 71046 X-RAY EXAM CHEST 2 VIEWS: CPT | Mod: 26

## 2023-07-14 PROCEDURE — 0225U NFCT DS DNA&RNA 21 SARSCOV2: CPT

## 2023-07-14 PROCEDURE — 82962 GLUCOSE BLOOD TEST: CPT

## 2023-07-14 PROCEDURE — 36415 COLL VENOUS BLD VENIPUNCTURE: CPT

## 2023-07-14 PROCEDURE — 81001 URINALYSIS AUTO W/SCOPE: CPT

## 2023-07-14 PROCEDURE — 0042T: CPT

## 2023-07-14 PROCEDURE — 70450 CT HEAD/BRAIN W/O DYE: CPT | Mod: MA

## 2023-07-14 PROCEDURE — 71046 X-RAY EXAM CHEST 2 VIEWS: CPT

## 2023-07-14 PROCEDURE — 93005 ELECTROCARDIOGRAM TRACING: CPT

## 2023-07-14 PROCEDURE — 70450 CT HEAD/BRAIN W/O DYE: CPT | Mod: 26,MA,XU

## 2023-07-14 PROCEDURE — 84484 ASSAY OF TROPONIN QUANT: CPT

## 2023-07-14 PROCEDURE — 70496 CT ANGIOGRAPHY HEAD: CPT | Mod: 26,MA

## 2023-07-14 PROCEDURE — 85025 COMPLETE CBC W/AUTO DIFF WBC: CPT

## 2023-07-14 PROCEDURE — 80053 COMPREHEN METABOLIC PANEL: CPT

## 2023-07-14 PROCEDURE — 93000 ELECTROCARDIOGRAM COMPLETE: CPT

## 2023-07-14 PROCEDURE — 99285 EMERGENCY DEPT VISIT HI MDM: CPT

## 2023-07-14 PROCEDURE — 85610 PROTHROMBIN TIME: CPT

## 2023-07-14 PROCEDURE — 70496 CT ANGIOGRAPHY HEAD: CPT | Mod: MA

## 2023-07-14 PROCEDURE — 70498 CT ANGIOGRAPHY NECK: CPT | Mod: 26,MA

## 2023-07-14 PROCEDURE — 99213 OFFICE O/P EST LOW 20 MIN: CPT

## 2023-07-14 RX ORDER — AMLODIPINE BESYLATE 5 MG/1
5 TABLET ORAL
Qty: 30 | Refills: 0 | Status: DISCONTINUED | COMMUNITY
Start: 2018-03-28 | End: 2023-07-14

## 2023-07-14 RX ORDER — ACETAMINOPHEN 500 MG
650 TABLET ORAL ONCE
Refills: 0 | Status: COMPLETED | OUTPATIENT
Start: 2023-07-14 | End: 2023-07-14

## 2023-07-14 NOTE — PHYSICAL EXAM
[FreeTextEntry1] : The patient is alert and oriented x3, follows commands, and is able to participate fully in the history taking.\par Speech is normal with no evidence of dysarthria.\par Memory is intact: Immediate recall 3 out of 3, short-term 3 out of 3, remote memory intact.\par Cranial nerves II through XII intact.\par Motor exam: Upper and lower extremities 5 out of 5 power, normal tone. No abnormal movements noted.\par Sensory exam: Intact to light touch and pinprick. Romberg negative.\par Coordination and vestibular exam: Finger to nose intact, no evidence of ataxia, nystagmus and no vestibular symptoms elicited with head turning during ambulation.\par Gait: Normal gait walking with walker. \par Reflexes: One to 2+ in upper and lower extremities. No pathological reflexes. Downgoing toes.

## 2023-07-14 NOTE — ED ADULT NURSE NOTE - NSFALLUNIVINTERV_ED_ALL_ED
Bed/Stretcher in lowest position, wheels locked, appropriate side rails in place/Call bell, personal items and telephone in reach/Instruct patient to call for assistance before getting out of bed/chair/stretcher/Non-slip footwear applied when patient is off stretcher/[place] to call system/Physically safe environment - no spills, clutter or unnecessary equipment/Purposeful proactive rounding/Room/bathroom lighting operational, light cord in reach

## 2023-07-14 NOTE — ED ADULT NURSE NOTE - NS ED TRIAGE CLINICAL UPGRADE
10-Dec-2018 15:41
Deteriorating patient status - Patient was clinically upgraded due to deteriorating patient status.

## 2023-07-14 NOTE — ED ADULT NURSE NOTE - OBJECTIVE STATEMENT
88 yoF PMH of Afib, pat reports taking eliquis, but stopping for a day when her gums started bleeding. BIBEMS after her internal medicine doctor thought she was having a stroke. Pt was having an episode of weakness and was slumped to one side. Pt  reports that she had a similar episode this morning but resolved on its own. Pt reports to the ED AOX4. strength is 2+ and equal in all extremities, Denies HA, and vision changes. Ambulates with steady gait to the stretcher. Dr. Hutson at bedside to evaluate the patient.

## 2023-07-14 NOTE — ED ADULT TRIAGE NOTE - CHIEF COMPLAINT QUOTE
pt states, "I was seeing my doctor and he thought I seemed weak and that I was slouching to my side but I feel fine." appt was at 3pm. sent in by internist to r/o stroke. pt denies unilateral weakness, blurry vision, dizziness.

## 2023-07-14 NOTE — ED PROVIDER NOTE - CLINICAL SUMMARY MEDICAL DECISION MAKING FREE TEXT BOX
Concern for transient weakness, given patient's age and cardiac history possible TIA as cause, will obtain neuro imaging and discuss case with stroke consult team.  R/o ACS or arrythmia as cause for previous symptoms.  R/o gross metabolic abnormality ?hypoglycemia ?anemia ?osiel with uremia ?hypernatremia as cause for previous mental status change.  No fever or pulmonary/ symptoms concerning for PNA / UTI, concern below threshold for UA and CXR.  No fever, alert mental status, and no neck stiffness, concern for encephalitis or bacterial meningitis below threshold for LP at this time. Concern for transient weakness, given patient's age and cardiac history possible TIA as cause, will obtain neuro imaging and discuss case with stroke consult team.  R/o other acute intracranial cause ?ICH ?mass.  R/o ACS or arrythmia as cause for previous symptoms.  R/o gross metabolic abnormality ?hypoglycemia ?anemia ?osiel with uremia ?hypernatremia as cause for previous mental status change.  No fever or pulmonary/ symptoms concerning for PNA / UTI, concern below threshold for UA and CXR.  No fever, alert mental status, and no neck stiffness, concern for encephalitis or bacterial meningitis below threshold for LP at this time.

## 2023-07-14 NOTE — ED ADULT NURSE REASSESSMENT NOTE - NS ED NURSE REASSESS COMMENT FT1
pt resting on stretcher no distress noted pending CT results for final dispo. found to have urinated in sheets. turned cleaned changed repositioned VS as noted family and pt updated on POC

## 2023-07-14 NOTE — ED PROVIDER NOTE - OBJECTIVE STATEMENT
As per previous medical records, patient, and , 88F with afib on eliquis, recent inpatient stroke workup for vertigo, now sent in by PMD for episode of weakness and slumped to one side, lasting approximately 15 minutes and resolving spontaneously, as per  had one similar episode this morning as well, patient currently with headache, patient unable to recount those episodes.

## 2023-07-14 NOTE — HISTORY OF PRESENT ILLNESS
[FreeTextEntry1] : Patient is an 88 year old patient with PMH HTN who presents as a hospital follow up. Patient presented to the ED on 6/5/23 with vertigo with room spinning with exacerbation of symptoms with any movement and relief of symptoms with rest. Exam significant for right beating nystagmus. On EKG, patient was found to be in Afib and hypertensive with systolic BP in 200s.\par Hospital labs: A1C 5.8, TSH 1.740, LDL 89\par Hospital imaging:\par CT Brain: No intracranial hemorrhage or acute transcortical infarct.\par CT Angio Head: No large vessel occlusion.\par CT Angio Neck: Normal CTA of the neck.\par CT Perfusion: Normal CT perfusion study.\par MR Head: No evidence of recent ischemic injury.\par TTE: 1. Normal left and right ventricular size and systolic function.\par  2. Mild symmetric left ventricular hypertrophy.\par  3. Mild-to-moderate aortic stenosis.\par  4. Mild aortic regurgitation.\par  5. Mild-to-moderate mitral regurgitation.\par  6. Mild-to-moderate tricuspid regurgitation.\par  7. No evidence of pulmonary hypertension, pulmonary artery systolic pressure is 29 mmHg.\par  8. Small pericardial effusion without echocardiographic evidence of cardiac tamponade physiology.\par \par Patient discharged on Eliquis 2.5mg BID and Atorvastatin 10mg daily\par \par Since discharge, patient is overall doing well and has only had 1 other episode of dizziness but it only lasted a second. Otherwise, patient denies any new symptoms and denies any stroke like symptoms.\par \par Patient states that she had an episode of bleeding with the Eliquis, she stopped taking it for a day and went back on the Eliquis and hasn't had another episode since then. Patient follows up with Dr. Tatum for PCP and Dr. Ken for Cardiology.\par \par In terms of exercise, patient walks every day for 2 miles with her walker. And patient follows a vegan diet but may have salmon once in a while.

## 2023-07-14 NOTE — ED PROVIDER NOTE - PROGRESS NOTE DETAILS
Patient now with fever in ED.  Will obtain CXR, UA, and viral testing. Infectious workup positive only for covid, likely cause of patient's symptoms.  Patient remains alert, comfortable, stable on room air.  Plan to discharge home with return precautions and outpatient followup.

## 2023-07-14 NOTE — ASSESSMENT
[FreeTextEntry1] : Plan\par - Recommended vestibular therapy, patient and  would like to discuss eveyrthing with Dr. Tatum and Dr. Ken prior to beginning vestibular therapy\par - Recommended patient continue to follow up with Dr. Tatum and Dr. Ken

## 2023-07-14 NOTE — ED PROVIDER NOTE - PHYSICAL EXAMINATION
General: comfortable, resting in ED  HEENT: atraumatic, no eye erythema or discharge  Pulm: no cyanosis, no added work of breathing  Cardiac: extremities warm, intact peripheral pulse  GI: no abdominal distension  Neuro: alert, conversant, CNII grossly intact bilaterally, CNIII-XII intact bilaterally, 5/5 strength upper and lower extremities bilaterally, intact sensation upper and lower extremities bilaterally, observed by nursing team walking independently  Psych: neutral affect, cooperative  Msk: no gross deformity or instability  Skin: no erythema or rash General: comfortable, resting in ED  HEENT: atraumatic, no eye erythema or discharge  Pulm: no cyanosis, no added work of breathing  Cardiac: extremities warm, intact peripheral pulse  GI: no abdominal distension  Neuro: alert, oriented to president and name of , not oriented to year or name of hospital, conversant, CNII grossly intact bilaterally, CNIII-XII intact bilaterally, 5/5 strength upper and lower extremities bilaterally, intact sensation upper and lower extremities bilaterally, observed by nursing team walking independently  Psych: neutral affect, cooperative  Msk: no gross deformity or instability  Skin: no erythema or rash

## 2023-07-14 NOTE — CONSULT NOTE ADULT - SUBJECTIVE AND OBJECTIVE BOX
**STROKE CODE CONSULT NOTE**    Last known well time/Time of onset of symptoms:    HPI: 88y Female with PMHx of     T(C): 38.9 (07-14-23 @ 18:40), Max: 38.9 (07-14-23 @ 18:40)  HR: 100 (07-14-23 @ 18:40) (95 - 100)  BP: 158/82 (07-14-23 @ 18:40) (158/82 - 160/66)  RR: 20 (07-14-23 @ 18:40) (20 - 20)  SpO2: 95% (07-14-23 @ 18:40) (95% - 95%)    PAST MEDICAL & SURGICAL HISTORY:      FAMILY HISTORY:      SOCIAL HISTORY:   Patient lives with *** at ***.   Smoking status:  Drinking:  Drug Use:     ROS: ***  Constitutional: No fever, weight loss or fatigue  Eyes: No eye pain, visual disturbances, or discharge  ENMT:  No difficulty hearing, tinnitus; No sinus or throat pain  Neck: No pain or stiffness  Respiratory: No cough, wheezing, chills or hemoptysis  Cardiovascular: No chest pain, palpitations, shortness of breath, or leg swelling  Gastrointestinal: No abdominal pain. No nausea, vomiting or hematemesis; No diarrhea or constipation. Nohematochezia.  Genitourinary: No dysuria, frequency, hematuria or incontinence  Neurological: As per HPI  Skin: No itching, burning, rashes or lesions   Endocrine: No heat or cold intolerance; No hair loss  Musculoskeletal: No joint pain or swelling; No muscle, back or extremity pain  Heme/Lymph: No easy bruising or bleeding gums    MEDICATIONS  (STANDING):    MEDICATIONS  (PRN):    Allergies    penicillin (Anaphylaxis)    Intolerances      Vital Signs Last 24 Hrs  T(C): 38.9 (14 Jul 2023 18:40), Max: 38.9 (14 Jul 2023 18:40)  T(F): 102 (14 Jul 2023 18:40), Max: 102 (14 Jul 2023 18:40)  HR: 100 (14 Jul 2023 18:40) (95 - 100)  BP: 158/82 (14 Jul 2023 18:40) (158/82 - 160/66)  BP(mean): --  RR: 20 (14 Jul 2023 18:40) (20 - 20)  SpO2: 95% (14 Jul 2023 18:40) (95% - 95%)    Parameters below as of 14 Jul 2023 18:40  Patient On (Oxygen Delivery Method): room air        Physical exam:  Constitutional: No acute distress, conversant  Eyes: Anicteric sclerae, moist conjunctivae, see below for CNs  Neck: trachea midline, FROM, supple, no thyromegaly or lymphadenopathy  Cardiovascular: Regular rate and rhythm, no murmurs, rubs, or gallops. No carotid bruits.   Pulmonary: Anterior breath sounds clear bilaterally, no crackles or wheezing. No use of accessory muscles  GI: Abdomen soft, non-distended, non-tender  Extremities: Radial and DP pulses +2, no edema    Neurologic:  -Mental status: Awake, alert, oriented to person, place, and time. Speech is fluent with intact naming, repetition, and comprehension, no dysarthria. Recent and remote memory intact. Follows commands. Attention/concentration intact. Fund of knowledge appropriate.  -Cranial nerves:   II: Visual fields are full to confrontation.  III, IV, VI: Extraocular movements are intact without nystagmus. Pupils equally round and reactive to light  V:  Facial sensation V1-V3 equal and intact   VII: Face is symmetric with normal eye closure and smile  VIII: Hearing is bilaterally intact to finger rub  IX, X: Uvula is midline and soft palate rises symmetrically  XI: Head turning and shoulder shrug are intact.  XII: Tongue protrudes midline  Motor: Normal bulk and tone. No pronator drift. Strength bilateral upper extremity 5/5, bilateral lower extremities 5/5.  Rapid alternating movements intact and symmetric  Sensation: Intact to light touch bilaterally. No neglect or extinction on double simultaneous testing.  Coordination: No dysmetria on finger-to-nose and heel-to-shin bilaterally  Reflexes: Downgoing toes bilaterally   Gait: Narrow gait and steady    NIHSS: **** ASPECT Score: ***** ICH Score: ****** (GCS)    Fingerstick Blood Glucose: CAPILLARY BLOOD GLUCOSE      POCT Blood Glucose.: 101 mg/dL (14 Jul 2023 16:50)    LABS:                        15.3   3.55  )-----------( 53       ( 14 Jul 2023 17:14 )             46.2     07-14    136  |  98  |  10  ----------------------------<  108<H>  4.2   |  25  |  0.85    Ca    9.2      14 Jul 2023 17:14    TPro  7.7  /  Alb  4.4  /  TBili  0.8  /  DBili  x   /  AST  44<H>  /  ALT  23  /  AlkPhos  95  07-14    PT/INR - ( 14 Jul 2023 17:14 )   PT: 16.5 sec;   INR: 1.38          PTT - ( 14 Jul 2023 17:14 )  PTT:37.0 sec      Urinalysis Basic - ( 14 Jul 2023 19:12 )    Color: Yellow / Appearance: Clear / SG: <=1.005 / pH: x  Gluc: x / Ketone: NEGATIVE  / Bili: Negative / Urobili: 0.2 E.U./dL   Blood: x / Protein: NEGATIVE mg/dL / Nitrite: NEGATIVE   Leuk Esterase: Trace / RBC: x / WBC x   Sq Epi: x / Non Sq Epi: x / Bacteria: x        RADIOLOGY & ADDITIONAL STUDIES:      -----------------------------------------------------------------------------------------------------------------  IV-tPA (Y/N):    ***                              Bolus time:    Alteplase Dose Verification w/ RN:  Reason IV-tPA not given: ***    **STROKE CODE CONSULT NOTE**    Last known well time/Time of onset of symptoms: 0900 7/14    HPI: 88y Female with PMHx of HTN, AFib on Eliquis (compliant), recent admission to stroke for eval of dizziness, no infarct found, discharged on 06/07/2023 with diagnosis of peripheral vertigo who presented to the ED for further evaluation of weakness. As per patient's , patient woke up this AM feeling herself however noticed at approx 0900 she slumped to one side and appeared to have generalized weakness x 15 minutes. Patient returned back to her baseline shortly after. Patient then went to her PCP appt at around 0300 where she was noted to have difficulty with ambulation and unsteady on her feet. When sitting on the exam table, she had an additional episode of slumping to one side and generalized weakness. Also lasted approx 15 minutes and then resolved. Patient was advised to come to the ED for further evaluation. SBP     T(C): 38.9 (07-14-23 @ 18:40), Max: 38.9 (07-14-23 @ 18:40)  HR: 100 (07-14-23 @ 18:40) (95 - 100)  BP: 158/82 (07-14-23 @ 18:40) (158/82 - 160/66)  RR: 20 (07-14-23 @ 18:40) (20 - 20)  SpO2: 95% (07-14-23 @ 18:40) (95% - 95%)    PAST MEDICAL & SURGICAL HISTORY:      FAMILY HISTORY:      SOCIAL HISTORY:   Patient lives with *** at ***.   Smoking status:  Drinking:  Drug Use:     ROS: ***  Constitutional: No fever, weight loss or fatigue  Eyes: No eye pain, visual disturbances, or discharge  ENMT:  No difficulty hearing, tinnitus; No sinus or throat pain  Neck: No pain or stiffness  Respiratory: No cough, wheezing, chills or hemoptysis  Cardiovascular: No chest pain, palpitations, shortness of breath, or leg swelling  Gastrointestinal: No abdominal pain. No nausea, vomiting or hematemesis; No diarrhea or constipation. Nohematochezia.  Genitourinary: No dysuria, frequency, hematuria or incontinence  Neurological: As per HPI  Skin: No itching, burning, rashes or lesions   Endocrine: No heat or cold intolerance; No hair loss  Musculoskeletal: No joint pain or swelling; No muscle, back or extremity pain  Heme/Lymph: No easy bruising or bleeding gums    MEDICATIONS  (STANDING):    MEDICATIONS  (PRN):    Allergies    penicillin (Anaphylaxis)    Intolerances      Vital Signs Last 24 Hrs  T(C): 38.9 (14 Jul 2023 18:40), Max: 38.9 (14 Jul 2023 18:40)  T(F): 102 (14 Jul 2023 18:40), Max: 102 (14 Jul 2023 18:40)  HR: 100 (14 Jul 2023 18:40) (95 - 100)  BP: 158/82 (14 Jul 2023 18:40) (158/82 - 160/66)  BP(mean): --  RR: 20 (14 Jul 2023 18:40) (20 - 20)  SpO2: 95% (14 Jul 2023 18:40) (95% - 95%)    Parameters below as of 14 Jul 2023 18:40  Patient On (Oxygen Delivery Method): room air        Physical exam:  Constitutional: No acute distress, conversant  Eyes: Anicteric sclerae, moist conjunctivae, see below for CNs  Neck: trachea midline, FROM, supple, no thyromegaly or lymphadenopathy  Cardiovascular: Regular rate and rhythm, no murmurs, rubs, or gallops. No carotid bruits.   Pulmonary: Anterior breath sounds clear bilaterally, no crackles or wheezing. No use of accessory muscles  GI: Abdomen soft, non-distended, non-tender  Extremities: Radial and DP pulses +2, no edema    Neurologic:  -Mental status: Awake, alert, oriented to person, place, and time. Speech is fluent with intact naming, repetition, and comprehension, no dysarthria. Recent and remote memory intact. Follows commands. Attention/concentration intact. Fund of knowledge appropriate.  -Cranial nerves:   II: Visual fields are full to confrontation.  III, IV, VI: Extraocular movements are intact without nystagmus. Pupils equally round and reactive to light  V:  Facial sensation V1-V3 equal and intact   VII: Face is symmetric with normal eye closure and smile  VIII: Hearing is bilaterally intact to finger rub  IX, X: Uvula is midline and soft palate rises symmetrically  XI: Head turning and shoulder shrug are intact.  XII: Tongue protrudes midline  Motor: Normal bulk and tone. No pronator drift. Strength bilateral upper extremity 5/5, bilateral lower extremities 5/5.  Rapid alternating movements intact and symmetric  Sensation: Intact to light touch bilaterally. No neglect or extinction on double simultaneous testing.  Coordination: No dysmetria on finger-to-nose and heel-to-shin bilaterally  Reflexes: Downgoing toes bilaterally   Gait: Narrow gait and steady    NIHSS: **** ASPECT Score: ***** ICH Score: ****** (GCS)    Fingerstick Blood Glucose: CAPILLARY BLOOD GLUCOSE      POCT Blood Glucose.: 101 mg/dL (14 Jul 2023 16:50)    LABS:                        15.3   3.55  )-----------( 53       ( 14 Jul 2023 17:14 )             46.2     07-14    136  |  98  |  10  ----------------------------<  108<H>  4.2   |  25  |  0.85    Ca    9.2      14 Jul 2023 17:14    TPro  7.7  /  Alb  4.4  /  TBili  0.8  /  DBili  x   /  AST  44<H>  /  ALT  23  /  AlkPhos  95  07-14    PT/INR - ( 14 Jul 2023 17:14 )   PT: 16.5 sec;   INR: 1.38          PTT - ( 14 Jul 2023 17:14 )  PTT:37.0 sec      Urinalysis Basic - ( 14 Jul 2023 19:12 )    Color: Yellow / Appearance: Clear / SG: <=1.005 / pH: x  Gluc: x / Ketone: NEGATIVE  / Bili: Negative / Urobili: 0.2 E.U./dL   Blood: x / Protein: NEGATIVE mg/dL / Nitrite: NEGATIVE   Leuk Esterase: Trace / RBC: x / WBC x   Sq Epi: x / Non Sq Epi: x / Bacteria: x        RADIOLOGY & ADDITIONAL STUDIES:      -----------------------------------------------------------------------------------------------------------------  IV-tPA (Y/N):    ***                              Bolus time:    Alteplase Dose Verification w/ RN:  Reason IV-tPA not given: ***    **STROKE CODE CONSULT NOTE**    Last known well time/Time of onset of symptoms: 0900 7/14    HPI: 88y Female with PMHx of HTN, AFib on Eliquis (compliant), recent admission to stroke for eval of dizziness, no infarct found, discharged on 06/07/2023 with diagnosis of peripheral vertigo who presented to the ED for further evaluation of weakness. As per patient's , patient woke up this AM feeling herself however noticed at approx 0900 she slumped to one side and appeared to have generalized weakness x 15 minutes. Patient returned back to her baseline shortly after. Patient then went to her PCP appt at around 0300 where she was noted to have difficulty with ambulation and unsteady on her feet. When sitting on the exam table, she had an additional episode of slumping to one side and generalized weakness. Also lasted approx 15 minutes and then resolved. Patient was advised to come to the ED for further evaluation. , temp 99.7 F, HR 95. When evaluated by stroke provider, patient currently offers no focal neurologic complaints, just stating that she has increased urinary frequency. Repeat temp 102 F, , . Stroke consulted for further recommendations.    T(C): 38.9 (07-14-23 @ 18:40), Max: 38.9 (07-14-23 @ 18:40)  HR: 100 (07-14-23 @ 18:40) (95 - 100)  BP: 158/82 (07-14-23 @ 18:40) (158/82 - 160/66)  RR: 20 (07-14-23 @ 18:40) (20 - 20)  SpO2: 95% (07-14-23 @ 18:40) (95% - 95%)    PAST MEDICAL & SURGICAL HISTORY:  HTN     AFib     Vertigo       ROS:   Constitutional: No weight loss or fatigue  Eyes: No eye pain, visual disturbances, or discharge  ENMT:  No difficulty hearing, tinnitus; No sinus or throat pain  Neck: No pain or stiffness  Respiratory: No cough, wheezing, chills or hemoptysis  Cardiovascular: No chest pain, palpitations, shortness of breath, or leg swelling  Gastrointestinal: No abdominal pain. No nausea, vomiting or hematemesis; No diarrhea or constipation. No hematochezia.  Genitourinary: No dysuria, frequency, hematuria or incontinence  Neurological: As per HPI  Skin: No itching, burning, rashes or lesions   Endocrine: No heat or cold intolerance; No hair loss  Musculoskeletal: No joint pain or swelling; No muscle, back or extremity pain  Heme/Lymph: No easy bruising or bleeding gums    MEDICATIONS  (STANDING):    MEDICATIONS  (PRN):    Allergies    penicillin (Anaphylaxis)    Intolerances      Vital Signs Last 24 Hrs  T(C): 38.9 (14 Jul 2023 18:40), Max: 38.9 (14 Jul 2023 18:40)  T(F): 102 (14 Jul 2023 18:40), Max: 102 (14 Jul 2023 18:40)  HR: 100 (14 Jul 2023 18:40) (95 - 100)  BP: 158/82 (14 Jul 2023 18:40) (158/82 - 160/66)  BP(mean): --  RR: 20 (14 Jul 2023 18:40) (20 - 20)  SpO2: 95% (14 Jul 2023 18:40) (95% - 95%)    Parameters below as of 14 Jul 2023 18:40  Patient On (Oxygen Delivery Method): room air        Physical exam:  Constitutional: No acute distress, conversant  Eyes: Anicteric sclerae, moist conjunctivae, see below for CNs  Extremities: No edema    Neurologic:  -Mental status: Awake, alert, oriented to person, place, and time. Speech is fluent with intact naming, repetition, and comprehension, no dysarthria. Follows commands. Attention/concentration intact. Fund of knowledge appropriate.  -Cranial nerves:   II: Visual fields are full to confrontation.  III, IV, VI: Extraocular movements are intact without nystagmus. Pupils equally round and reactive to light  V:  Facial sensation V1-V3 equal and intact   VII: Face is symmetric with normal eye closure and smile  XII: Tongue protrudes midline  Motor: Normal bulk and tone. No pronator drift. Strength bilateral upper extremity 5/5, bilateral lower extremities 5/5.  Sensation: Intact to light touch bilaterally. No neglect or extinction on double simultaneous testing.  Coordination: No dysmetria on finger-to-nose and heel-to-shin bilaterally  Gait: Deferred    NIHSS: 0     Fingerstick Blood Glucose: CAPILLARY BLOOD GLUCOSE      POCT Blood Glucose.: 101 mg/dL (14 Jul 2023 16:50)    LABS:                        15.3   3.55  )-----------( 53       ( 14 Jul 2023 17:14 )             46.2     07-14    136  |  98  |  10  ----------------------------<  108<H>  4.2   |  25  |  0.85    Ca    9.2      14 Jul 2023 17:14    TPro  7.7  /  Alb  4.4  /  TBili  0.8  /  DBili  x   /  AST  44<H>  /  ALT  23  /  AlkPhos  95  07-14    PT/INR - ( 14 Jul 2023 17:14 )   PT: 16.5 sec;   INR: 1.38          PTT - ( 14 Jul 2023 17:14 )  PTT:37.0 sec      Urinalysis Basic - ( 14 Jul 2023 19:12 )    Color: Yellow / Appearance: Clear / SG: <=1.005 / pH: x  Gluc: x / Ketone: NEGATIVE  / Bili: Negative / Urobili: 0.2 E.U./dL   Blood: x / Protein: NEGATIVE mg/dL / Nitrite: NEGATIVE   Leuk Esterase: Trace / RBC: x / WBC x   Sq Epi: x / Non Sq Epi: x / Bacteria: x        RADIOLOGY & ADDITIONAL STUDIES:      -----------------------------------------------------------------------------------------------------------------  IV-tPA (Y/N):   N  Reason IV-tPA not given: OOW     **STROKE CODE CONSULT NOTE**    Last known well time/Time of onset of symptoms: 0900 7/14    HPI: 88y Female with PMHx of HTN, AFib on Eliquis (compliant), recent admission to stroke for eval of dizziness, no infarct found, discharged on 06/07/2023 with diagnosis of peripheral vertigo who presented to the ED for further evaluation of weakness. As per patient's , patient woke up this AM feeling herself however noticed at approx 0900 she slumped to one side and appeared to have generalized weakness x 15 minutes. Patient returned back to her baseline shortly after. Patient then went to her PCP appt at around 0300 where she was noted to have difficulty with ambulation and unsteady on her feet. When sitting on the exam table, she had an additional episode of slumping to one side and generalized weakness. Also lasted approx 15 minutes and then resolved. Patient was advised to come to the ED for further evaluation. , temp 99.7 F, HR 95. When evaluated by stroke provider, patient currently offers no focal neurologic complaints, just stating that she has increased urinary frequency. Repeat temp 102 F, , . Stroke consulted for further recommendations.    T(C): 38.9 (07-14-23 @ 18:40), Max: 38.9 (07-14-23 @ 18:40)  HR: 100 (07-14-23 @ 18:40) (95 - 100)  BP: 158/82 (07-14-23 @ 18:40) (158/82 - 160/66)  RR: 20 (07-14-23 @ 18:40) (20 - 20)  SpO2: 95% (07-14-23 @ 18:40) (95% - 95%)    PAST MEDICAL & SURGICAL HISTORY:  HTN     AFib     Vertigo       ROS:   Constitutional: No weight loss or fatigue  Eyes: No eye pain, visual disturbances, or discharge  ENMT:  No difficulty hearing, tinnitus; No sinus or throat pain  Neck: No pain or stiffness  Respiratory: No cough, wheezing, chills or hemoptysis  Cardiovascular: No chest pain, palpitations, shortness of breath, or leg swelling  Gastrointestinal: No abdominal pain. No nausea, vomiting or hematemesis; No diarrhea or constipation. No hematochezia.  Genitourinary: No dysuria, frequency, hematuria or incontinence  Neurological: As per HPI  Skin: No itching, burning, rashes or lesions   Endocrine: No heat or cold intolerance; No hair loss  Musculoskeletal: No joint pain or swelling; No muscle, back or extremity pain  Heme/Lymph: No easy bruising or bleeding gums    MEDICATIONS  (STANDING):    MEDICATIONS  (PRN):    Allergies    penicillin (Anaphylaxis)    Intolerances      Vital Signs Last 24 Hrs  T(C): 38.9 (14 Jul 2023 18:40), Max: 38.9 (14 Jul 2023 18:40)  T(F): 102 (14 Jul 2023 18:40), Max: 102 (14 Jul 2023 18:40)  HR: 100 (14 Jul 2023 18:40) (95 - 100)  BP: 158/82 (14 Jul 2023 18:40) (158/82 - 160/66)  BP(mean): --  RR: 20 (14 Jul 2023 18:40) (20 - 20)  SpO2: 95% (14 Jul 2023 18:40) (95% - 95%)    Parameters below as of 14 Jul 2023 18:40  Patient On (Oxygen Delivery Method): room air        Physical exam:  Constitutional: No acute distress, conversant  Eyes: Anicteric sclerae, moist conjunctivae, see below for CNs  Extremities: No edema    Neurologic:  -Mental status: Awake, alert, oriented to person, place, and time. Speech is fluent with intact naming, repetition, and comprehension, no dysarthria. Follows commands. Attention/concentration intact. Fund of knowledge appropriate.  -Cranial nerves:   II: Visual fields are full to confrontation.  III, IV, VI: Extraocular movements are intact without nystagmus. Pupils equally round and reactive to light  V:  Facial sensation V1-V3 equal and intact   VII: Face is symmetric with normal eye closure and smile  XII: Tongue protrudes midline  Motor: Normal bulk and tone. No pronator drift. Strength bilateral upper extremity 5/5, bilateral lower extremities 5/5.  Sensation: Intact to light touch bilaterally. No neglect or extinction on double simultaneous testing.  Coordination: No dysmetria on finger-to-nose and heel-to-shin bilaterally  Gait: Deferred    NIHSS: 0     Fingerstick Blood Glucose: CAPILLARY BLOOD GLUCOSE      POCT Blood Glucose.: 101 mg/dL (14 Jul 2023 16:50)    LABS:                        15.3   3.55  )-----------( 53       ( 14 Jul 2023 17:14 )             46.2     07-14    136  |  98  |  10  ----------------------------<  108<H>  4.2   |  25  |  0.85    Ca    9.2      14 Jul 2023 17:14    TPro  7.7  /  Alb  4.4  /  TBili  0.8  /  DBili  x   /  AST  44<H>  /  ALT  23  /  AlkPhos  95  07-14    PT/INR - ( 14 Jul 2023 17:14 )   PT: 16.5 sec;   INR: 1.38          PTT - ( 14 Jul 2023 17:14 )  PTT:37.0 sec      Urinalysis Basic - ( 14 Jul 2023 19:12 )    Color: Yellow / Appearance: Clear / SG: <=1.005 / pH: x  Gluc: x / Ketone: NEGATIVE  / Bili: Negative / Urobili: 0.2 E.U./dL   Blood: x / Protein: NEGATIVE mg/dL / Nitrite: NEGATIVE   Leuk Esterase: Trace / RBC: x / WBC x   Sq Epi: x / Non Sq Epi: x / Bacteria: x        RADIOLOGY & ADDITIONAL STUDIES:  < from: CT Head No Cont (07.14.23 @ 20:25) >    IMPRESSION: No intracranial hemorrhage or acute transcortical infarct.   Stable exam.    < end of copied text >  < from: CT Perfusion w/ Maps w/ IV Cont (07.14.23 @ 20:26) >  IMPRESSION:    Negative CT perfusion Study.    < end of copied text >  < from: CT Angio Neck w/ IV Cont (07.14.23 @ 20:27) >  IMPRESSION: Normal CTA of the neck.    < end of copied text >  < from: CT Head No Cont (07.14.23 @ 20:25) >  IMPRESSION: No intracranial hemorrhage or acute transcortical infarct.   Stable exam.    < end of copied text >      -----------------------------------------------------------------------------------------------------------------  IV-tPA (Y/N):   N  Reason IV-tPA not given: OOW

## 2023-07-14 NOTE — ED PROVIDER NOTE - NSFOLLOWUPINSTRUCTIONS_ED_ALL_ED_FT
Spoke to Lauren CT SCAN tech , they are not ready for the patient yet to do CT scan, awaiting for patient to be transported to  the room. Endorsed to [facility] RN to follow up for CT SCAN.   COVID-19  COVID-19, or coronavirus disease 2019, is an infection that is caused by a new (novel) coronavirus called SARS-CoV-2. COVID-19 can cause many symptoms. In some people, the virus may not cause any symptoms. In others, it may cause mild or severe symptoms. Some people with severe infection develop severe disease.    What are the causes?  The human body, showing how the coronavirus travels from the air to a person's lungs.  This illness is caused by a virus. The virus may be in the air as tiny specks of fluid (aerosols) or droplets, or it may be on surfaces. You may catch the virus by:  Breathing in droplets from an infected person. Droplets can be spread by a person breathing, speaking, singing, coughing, or sneezing.  Touching something, like a table or a doorknob, that has virus on it (is contaminated) and then touching your mouth, nose, or eyes.  What increases the risk?  Risk for infection:    You are more likely to get infected with the COVID-19 virus if:  You are within 6 ft (1.8 m) of a person with COVID-19 for 15 minutes or longer.  You are providing care for a person who is infected with COVID-19.  You are in close personal contact with other people. Close personal contact includes hugging, kissing, or sharing eating or drinking utensils.  Risk for serious illness caused by COVID-19:    You are more likely to get seriously ill from the COVID-19 virus if:  You have cancer.  You have a long-term (chronic) disease, such as:  Chronic lung disease. This includes pulmonary embolism, chronic obstructive pulmonary disease, and cystic fibrosis.  Long-term disease that lowers your body's ability to fight infection (immunocompromise).  Serious cardiac conditions, such as heart failure, coronary artery disease, or cardiomyopathy.  Diabetes.  Chronic kidney disease.  Liver diseases. These include cirrhosis, nonalcoholic fatty liver disease, alcoholic liver disease, or autoimmune hepatitis.  You have obesity.  You are pregnant or were recently pregnant.  You have sickle cell disease.  What are the signs or symptoms?  Symptoms of this condition can range from mild to severe. Symptoms may appear any time from 2 to 14 days after being exposed to the virus. They include:  Fever or chills.  Shortness of breath or trouble breathing.  Feeling tired or very tired.  Headaches, body aches, or muscle aches.  Runny or stuffy nose, sneezing, coughing, or sore throat.  New loss of taste or smell. This is rare.  Some people may also have stomach problems, such as nausea, vomiting, or diarrhea.    Other people may not have any symptoms of COVID-19.    How is this diagnosed?  A sample being collected by swabbing the nose.  This condition may be diagnosed by testing samples to check for the COVID-19 virus. The most common tests are the PCR test and the antigen test. Tests may be done in the lab or at home. They include:  Using a swab to take a sample of fluid from the back of your nose and throat (nasopharyngeal fluid), from your nose, or from your throat.  Testing a sample of saliva from your mouth.  Testing a sample of coughed-up mucus from your lungs (sputum).  How is this treated?  Treatment for COVID-19 infection depends on the severity of the condition.  Mild symptoms can be managed at home with rest, fluids, and over-the-counter medicines.  Serious symptoms may be treated in a hospital intensive care unit (ICU). Treatment in the ICU may include:  Supplemental oxygen. Extra oxygen is given through a tube in the nose, a face mask, or a zabala.  Medicines. These may include:  Antivirals, such as monoclonal antibodies. These help your body fight off certain viruses that can cause disease.  Anti-inflammatories, such as corticosteroids. These reduce inflammation and suppress the immune system.  Antithrombotics. These prevent or treat blood clots, if they develop.  Convalescent plasma. This helps boost your immune system, if you have an underlying immunosuppressive condition or are getting immunosuppressive treatments.  Prone positioning. This means you will lie on your stomach. This helps oxygen to get into your lungs.  Infection control measures.  If you are at risk for more serious illness caused by COVID-19, your health care provider may prescribe two long-acting monoclonal antibodies, given together every 6 months.    How is this prevented?  To protect yourself:    Use preventive medicine (pre-exposure prophylaxis). You may get pre-exposure prophylaxis if you have moderate or severe immunocompromise.  Get vaccinated. Anyone 6 months old or older who meets guidelines can get a COVID-19 vaccine or vaccine series. This includes people who are pregnant or making breast milk (lactating).  Get an added dose of COVID-19 vaccine after your first vaccine or vaccine series if you have moderate to severe immunocompromise. This applies if you have had a solid organ transplant or have been diagnosed with an immunocompromising condition.  You should get the added dose 4 weeks after you got the first COVID-19 vaccine or vaccine series.  If you get an mRNA vaccine, you will need a 3-dose primary series.  If you get the J&J/Yusuf vaccine, you will need a 2-dose primary series, with the second dose being an mRNA vaccine.  Talk to your health care provider about getting experimental monoclonal antibodies. This treatment is approved under emergency use authorization to prevent severe illness before or after being exposed to the COVID-19 virus. You may be given monoclonal antibodies if:  You have moderate or severe immunocompromise. This includes treatments that lower your immune response. People with immunocompromise may not develop protection against COVID-19 when they are vaccinated.  You cannot be vaccinated. You may not get a vaccine if you have a severe allergic reaction to the vaccine or its components.  You are not fully vaccinated.  You are in a facility where COVID-19 is present and:  Are in close contact with a person who is infected with the COVID-19 virus.  Are at high risk of being exposed to the COVID-19 virus.  You are at risk of illness from new variants of the COVID-19 virus.  To protect others:    If you have symptoms of COVID-19, take steps to prevent the virus from spreading to others.  Stay home. Leave your house only to get medical care. Do not use public transit, if possible.  Do not travel while you are sick.  Wash your hands often with soap and water for at least 20 seconds. If soap and water are not available, use alcohol-based hand .  Make sure that all people in your household wash their hands well and often.  Cough or sneeze into a tissue or your sleeve or elbow. Do not cough or sneeze into your hand or into the air.  Where to find more information  Centers for Disease Control and Prevention: www.cdc.gov/coronavirus  World Health Organization: www.who.int/health-topics/coronavirus  Get help right away if:  You have trouble breathing.  You have pain or pressure in your chest.  You are confused.  You have bluish lips and fingernails.  You have trouble waking from sleep.  You have symptoms that get worse.  These symptoms may be an emergency. Get help right away. Call 911.  Do not wait to see if the symptoms will go away.  Do not drive yourself to the hospital.  Summary  COVID-19 is an infection that is caused by a new coronavirus.  Sometimes, there are no symptoms. Other times, symptoms range from mild to severe. Some people with a severe COVID-19 infection develop severe disease.  The virus that causes COVID-19 can spread from person to person through droplets or aerosols from breathing, speaking, singing, coughing, or sneezing.  Mild symptoms of COVID-19 can be managed at home with rest, fluids, and over-the-counter medicines.  This information is not intended to replace advice given to you by your health care provider. Make sure you discuss any questions you have with your health care provider.

## 2023-07-14 NOTE — ED PROVIDER NOTE - NS ED ROS FT
REVIEW OF SYSTEMS  positive for:   negative for: fever, headache, eye pain, runny nose, sore throat, cough, shortness of breath, chest pain, stomach pain, vomiting, diarrhea, dysuria, myalgias, rash REVIEW OF SYSTEMS  positive for: headache,   negative for: fever, eye pain, runny nose, sore throat, cough, shortness of breath, chest pain, stomach pain, vomiting, diarrhea, dysuria, myalgias, rash

## 2023-07-14 NOTE — ED PROVIDER NOTE - NSFOLLOWUPCLINICS_GEN_ALL_ED_FT
[facility]  Family Medicine  [address], [address]  [place], [place] [address]  Phone: [phone]  Fax:

## 2023-07-15 PROBLEM — R53.1 WEAKNESS: Status: ACTIVE | Noted: 2023-07-14

## 2023-07-15 PROBLEM — I48.91 ATRIAL FIBRILLATION: Status: ACTIVE | Noted: 2023-07-08

## 2023-07-15 PROBLEM — R31.29 MICROHEMATURIA: Status: ACTIVE | Noted: 2018-03-29

## 2023-07-15 PROBLEM — R55 PRE-SYNCOPE: Status: ACTIVE | Noted: 2023-07-15

## 2023-07-15 PROBLEM — E78.5 HYPERLIPIDEMIA: Status: ACTIVE | Noted: 2023-07-14

## 2023-07-15 PROBLEM — R42 VERTIGO: Status: ACTIVE | Noted: 2023-07-14

## 2023-07-15 PROBLEM — I10 BENIGN ESSENTIAL HYPERTENSION: Status: ACTIVE | Noted: 2018-03-28

## 2023-07-15 NOTE — HISTORY OF PRESENT ILLNESS
[FreeTextEntry1] : manjinder [de-identified] : \par 87 yo F w/ h/o controlled HTN, stable actinic keratosis, stable thrombocytopenia, poorly controlled cataracts, stable mild aortic stenosis, stable microhematuria here for vertigo\par - no more vertigo\par \par - had blood in mouth yesterday but gone now\par \par - trouble getting out of bed- feels weak\par \par - been urinating more often\par \par Other active problems, past medical history, and surgical history, family history, social history, medications and allergies reviewed and reconciled.\par

## 2023-07-15 NOTE — PHYSICAL EXAM
[Well Nourished] : well nourished [Well Developed] : well developed [Well-Appearing] : well-appearing [Normal Voice/Communication] : normal voice/communication [Normal Sclera/Conjunctiva] : normal sclera/conjunctiva [Normal Outer Ear/Nose] : the outer ears and nose were normal in appearance [No JVD] : no jugular venous distention [No Respiratory Distress] : no respiratory distress  [No Accessory Muscle Use] : no accessory muscle use [Clear to Auscultation] : lungs were clear to auscultation bilaterally [Regular Rhythm] : with a regular rhythm [Normal S1, S2] : normal S1 and S2 [Soft] : abdomen soft [Non Tender] : non-tender [Non-distended] : non-distended [No HSM] : no HSM [Normal Bowel Sounds] : normal bowel sounds [Speech Grossly Normal] : speech grossly normal [Memory Grossly Normal] : memory grossly normal [Normal Affect] : the affect was normal [Normal Mood] : the mood was normal [Normal Insight/Judgement] : insight and judgment were intact [de-identified] : unable to sit up for part of the visit [de-identified] : wolfe mil sem in rusb [de-identified] : u [de-identified] : unable to sit, up or stand

## 2023-07-15 NOTE — PLAN
[FreeTextEntry1] : \par \par \par \par ====== chart reviewed in detail since last visit ==========\par \par [] f/u neuro , cards for aortic stenosis\par \par [] should monitor bp at home\par \par [] [facility] FOR balance- interested in home pt later\par \par

## 2023-07-20 RX ORDER — APIXABAN 2.5 MG/1
2.5 TABLET, FILM COATED ORAL
Qty: 120 | Refills: 0 | Status: ACTIVE | COMMUNITY
Start: 2023-07-08 | End: 1900-01-01

## 2023-07-26 NOTE — DISCHARGE NOTE NURSING/CASE MANAGEMENT/SOCIAL WORK - NSDCFUADDAPPT_GEN_ALL_CORE_FT
Release singed and sent to appropriate office for records request.  Vaccine record printed for patient and given to mom. Continue to take eliquis 2.5mg twice a day and atorvastatin 10mg for cholesterol as well as your BP meds.    Follow up with your PCP within 2 weeks of discharge.    Have your PCP make a referral for vestibular therapy for your vertigo.    If you do not have a PCP please call [phone] to make appointment with one

## 2023-09-09 RX ORDER — ATORVASTATIN CALCIUM 10 MG/1
10 TABLET, FILM COATED ORAL
Qty: 30 | Refills: 0 | Status: ACTIVE | COMMUNITY
Start: 2023-07-14 | End: 1900-01-01

## 2023-09-14 LAB — GLUCOSE BLDC GLUCOMTR-MCNC: 101

## 2023-11-01 NOTE — DISCHARGE NOTE PROVIDER - NSDCHC_MEDRECSTATUS_GEN_ALL_CORE
Admission Reconciliation is Not Complete  Discharge Reconciliation is Not Complete Admission Reconciliation is Completed  Discharge Reconciliation is Not Complete Admission Reconciliation is Completed  Discharge Reconciliation is Completed Statement Selected

## 2023-12-13 NOTE — CONSULT NOTE ADULT - ASSESSMENT
Addended by: IJEOMA MORRISON on: 12/13/2023 10:33 AM     Modules accepted: Orders     88y Female with PMHx of HTN, AFib on Eliquis (compliant), recent admission to stroke for eval of dizziness, no infarct found, discharged on 06/07/2023 with diagnosis of peripheral vertigo who presented to the ED for further evaluation of weakness. LKW ~9 AM this morning.  witnessed x2 episodes of patient slumping over to one side and appearing generally weak. Was at PCPs office and was advised to come to the ED for further evaluation. Stroke consulted. Initial NIH 0. CTs _.     Recommendations:   - Symptoms likely 2/2 to infection, would r/o fever of unknown origin   - Patient without focal neuro deficits, negative CTs. Stroke to sign off at this time.   - Rest of care per primary team      Discussed with Dr. Monet, Stroke Attending

## 2024-01-05 ENCOUNTER — APPOINTMENT (OUTPATIENT)
Dept: NEUROLOGY | Facility: CLINIC | Age: 89
End: 2024-01-05
